# Patient Record
Sex: MALE | Race: BLACK OR AFRICAN AMERICAN | NOT HISPANIC OR LATINO | Employment: UNEMPLOYED | RURAL
[De-identification: names, ages, dates, MRNs, and addresses within clinical notes are randomized per-mention and may not be internally consistent; named-entity substitution may affect disease eponyms.]

---

## 2021-11-10 ENCOUNTER — OFFICE VISIT (OUTPATIENT)
Dept: CARDIOLOGY | Facility: CLINIC | Age: 66
End: 2021-11-10
Payer: MEDICARE

## 2021-11-10 VITALS
OXYGEN SATURATION: 98 % | BODY MASS INDEX: 34.27 KG/M2 | HEART RATE: 80 BPM | SYSTOLIC BLOOD PRESSURE: 112 MMHG | WEIGHT: 253 LBS | HEIGHT: 72 IN | DIASTOLIC BLOOD PRESSURE: 72 MMHG

## 2021-11-10 VITALS
HEART RATE: 64 BPM | HEIGHT: 72 IN | DIASTOLIC BLOOD PRESSURE: 70 MMHG | BODY MASS INDEX: 35.08 KG/M2 | SYSTOLIC BLOOD PRESSURE: 115 MMHG | WEIGHT: 259 LBS

## 2021-11-10 DIAGNOSIS — E78.5 HYPERLIPIDEMIA, UNSPECIFIED HYPERLIPIDEMIA TYPE: ICD-10-CM

## 2021-11-10 DIAGNOSIS — I10 HYPERTENSION, UNSPECIFIED TYPE: Primary | ICD-10-CM

## 2021-11-10 DIAGNOSIS — I50.32 CHRONIC DIASTOLIC CONGESTIVE HEART FAILURE: ICD-10-CM

## 2021-11-10 PROCEDURE — 1159F MED LIST DOCD IN RCRD: CPT | Mod: CPTII,,, | Performed by: NURSE PRACTITIONER

## 2021-11-10 PROCEDURE — 93010 EKG 12-LEAD: ICD-10-PCS | Mod: S$PBB,,, | Performed by: INTERNAL MEDICINE

## 2021-11-10 PROCEDURE — 99214 OFFICE O/P EST MOD 30 MIN: CPT | Mod: PBBFAC | Performed by: NURSE PRACTITIONER

## 2021-11-10 PROCEDURE — 1160F PR REVIEW ALL MEDS BY PRESCRIBER/CLIN PHARMACIST DOCUMENTED: ICD-10-PCS | Mod: CPTII,,, | Performed by: NURSE PRACTITIONER

## 2021-11-10 PROCEDURE — 3008F BODY MASS INDEX DOCD: CPT | Mod: CPTII,,, | Performed by: NURSE PRACTITIONER

## 2021-11-10 PROCEDURE — 3008F PR BODY MASS INDEX (BMI) DOCUMENTED: ICD-10-PCS | Mod: CPTII,,, | Performed by: NURSE PRACTITIONER

## 2021-11-10 PROCEDURE — 3078F DIAST BP <80 MM HG: CPT | Mod: CPTII,,, | Performed by: NURSE PRACTITIONER

## 2021-11-10 PROCEDURE — 1159F PR MEDICATION LIST DOCUMENTED IN MEDICAL RECORD: ICD-10-PCS | Mod: CPTII,,, | Performed by: NURSE PRACTITIONER

## 2021-11-10 PROCEDURE — 93010 ELECTROCARDIOGRAM REPORT: CPT | Mod: S$PBB,,, | Performed by: INTERNAL MEDICINE

## 2021-11-10 PROCEDURE — 3078F PR MOST RECENT DIASTOLIC BLOOD PRESSURE < 80 MM HG: ICD-10-PCS | Mod: CPTII,,, | Performed by: NURSE PRACTITIONER

## 2021-11-10 PROCEDURE — 99214 PR OFFICE/OUTPT VISIT, EST, LEVL IV, 30-39 MIN: ICD-10-PCS | Mod: S$PBB,,, | Performed by: NURSE PRACTITIONER

## 2021-11-10 PROCEDURE — 3074F PR MOST RECENT SYSTOLIC BLOOD PRESSURE < 130 MM HG: ICD-10-PCS | Mod: CPTII,,, | Performed by: NURSE PRACTITIONER

## 2021-11-10 PROCEDURE — 3074F SYST BP LT 130 MM HG: CPT | Mod: CPTII,,, | Performed by: NURSE PRACTITIONER

## 2021-11-10 PROCEDURE — 93005 ELECTROCARDIOGRAM TRACING: CPT | Mod: PBBFAC | Performed by: INTERNAL MEDICINE

## 2021-11-10 PROCEDURE — 99214 OFFICE O/P EST MOD 30 MIN: CPT | Mod: S$PBB,,, | Performed by: NURSE PRACTITIONER

## 2021-11-10 PROCEDURE — 1160F RVW MEDS BY RX/DR IN RCRD: CPT | Mod: CPTII,,, | Performed by: NURSE PRACTITIONER

## 2021-11-10 RX ORDER — ASPIRIN 81 MG/1
81 TABLET ORAL DAILY
COMMUNITY
End: 2024-03-19 | Stop reason: SDUPTHER

## 2021-11-10 RX ORDER — AMLODIPINE BESYLATE 10 MG/1
10 TABLET ORAL DAILY
COMMUNITY
Start: 2021-07-27

## 2021-11-10 RX ORDER — GLIPIZIDE 10 MG/1
10 TABLET ORAL 2 TIMES DAILY
COMMUNITY

## 2021-11-10 RX ORDER — ATORVASTATIN CALCIUM 40 MG/1
1 TABLET, FILM COATED ORAL NIGHTLY
COMMUNITY
Start: 2021-09-28

## 2021-11-10 RX ORDER — CHOLECALCIFEROL (VITAMIN D3) 50 MCG
1 CAPSULE ORAL DAILY
COMMUNITY
End: 2024-03-19 | Stop reason: SDUPTHER

## 2021-11-10 RX ORDER — INSULIN GLARGINE 100 [IU]/ML
42 INJECTION, SOLUTION SUBCUTANEOUS DAILY
COMMUNITY

## 2021-11-10 RX ORDER — PANTOPRAZOLE SODIUM 40 MG/1
40 TABLET, DELAYED RELEASE ORAL DAILY
COMMUNITY

## 2021-11-10 RX ORDER — CARVEDILOL 25 MG/1
25 TABLET ORAL 2 TIMES DAILY
COMMUNITY

## 2021-11-10 RX ORDER — FUROSEMIDE 20 MG/1
10 TABLET ORAL DAILY
COMMUNITY
Start: 2021-07-07

## 2021-11-10 RX ORDER — WARFARIN SODIUM 5 MG/1
5 TABLET ORAL DAILY
COMMUNITY

## 2022-11-10 ENCOUNTER — OFFICE VISIT (OUTPATIENT)
Dept: CARDIOLOGY | Facility: CLINIC | Age: 67
End: 2022-11-10
Payer: MEDICARE

## 2022-11-10 VITALS
WEIGHT: 244 LBS | HEART RATE: 72 BPM | HEIGHT: 72 IN | DIASTOLIC BLOOD PRESSURE: 80 MMHG | SYSTOLIC BLOOD PRESSURE: 124 MMHG | BODY MASS INDEX: 33.05 KG/M2 | OXYGEN SATURATION: 99 %

## 2022-11-10 DIAGNOSIS — I50.32 HYPERTENSIVE HEART DISEASE WITH CHRONIC DIASTOLIC CONGESTIVE HEART FAILURE: Primary | ICD-10-CM

## 2022-11-10 DIAGNOSIS — I11.0 HYPERTENSIVE HEART DISEASE WITH CHRONIC DIASTOLIC CONGESTIVE HEART FAILURE: Primary | ICD-10-CM

## 2022-11-10 PROCEDURE — 3008F PR BODY MASS INDEX (BMI) DOCUMENTED: ICD-10-PCS | Mod: CPTII,,, | Performed by: NURSE PRACTITIONER

## 2022-11-10 PROCEDURE — 3074F SYST BP LT 130 MM HG: CPT | Mod: CPTII,,, | Performed by: NURSE PRACTITIONER

## 2022-11-10 PROCEDURE — 3008F BODY MASS INDEX DOCD: CPT | Mod: CPTII,,, | Performed by: NURSE PRACTITIONER

## 2022-11-10 PROCEDURE — 1159F MED LIST DOCD IN RCRD: CPT | Mod: CPTII,,, | Performed by: NURSE PRACTITIONER

## 2022-11-10 PROCEDURE — 3079F PR MOST RECENT DIASTOLIC BLOOD PRESSURE 80-89 MM HG: ICD-10-PCS | Mod: CPTII,,, | Performed by: NURSE PRACTITIONER

## 2022-11-10 PROCEDURE — 3079F DIAST BP 80-89 MM HG: CPT | Mod: CPTII,,, | Performed by: NURSE PRACTITIONER

## 2022-11-10 PROCEDURE — 99214 OFFICE O/P EST MOD 30 MIN: CPT | Mod: S$PBB,,, | Performed by: NURSE PRACTITIONER

## 2022-11-10 PROCEDURE — 99214 PR OFFICE/OUTPT VISIT, EST, LEVL IV, 30-39 MIN: ICD-10-PCS | Mod: S$PBB,,, | Performed by: NURSE PRACTITIONER

## 2022-11-10 PROCEDURE — 1160F PR REVIEW ALL MEDS BY PRESCRIBER/CLIN PHARMACIST DOCUMENTED: ICD-10-PCS | Mod: CPTII,,, | Performed by: NURSE PRACTITIONER

## 2022-11-10 PROCEDURE — 93010 EKG 12-LEAD: ICD-10-PCS | Mod: S$PBB,,, | Performed by: INTERNAL MEDICINE

## 2022-11-10 PROCEDURE — 93005 ELECTROCARDIOGRAM TRACING: CPT | Mod: PBBFAC | Performed by: INTERNAL MEDICINE

## 2022-11-10 PROCEDURE — 1160F RVW MEDS BY RX/DR IN RCRD: CPT | Mod: CPTII,,, | Performed by: NURSE PRACTITIONER

## 2022-11-10 PROCEDURE — 1159F PR MEDICATION LIST DOCUMENTED IN MEDICAL RECORD: ICD-10-PCS | Mod: CPTII,,, | Performed by: NURSE PRACTITIONER

## 2022-11-10 PROCEDURE — 99214 OFFICE O/P EST MOD 30 MIN: CPT | Mod: PBBFAC | Performed by: NURSE PRACTITIONER

## 2022-11-10 PROCEDURE — 93010 ELECTROCARDIOGRAM REPORT: CPT | Mod: S$PBB,,, | Performed by: INTERNAL MEDICINE

## 2022-11-10 PROCEDURE — 3074F PR MOST RECENT SYSTOLIC BLOOD PRESSURE < 130 MM HG: ICD-10-PCS | Mod: CPTII,,, | Performed by: NURSE PRACTITIONER

## 2022-11-10 RX ORDER — POTASSIUM CHLORIDE 750 MG/1
10 CAPSULE, EXTENDED RELEASE ORAL 2 TIMES DAILY
COMMUNITY
Start: 2022-09-28

## 2022-11-10 NOTE — PROGRESS NOTES
"[facility] note        DATE OF SERVICE: 11/10/2022       PCP: Jamarcus Lao MD      CHIEF COMPLAINT:   Chief Complaint   Patient presents with    Follow-up     Patient denies any cardiac complaints today.        HISTORY OF PRESENT ILLNESS:  Fredy Clemetn is a 67 y.o. male with a PMH of   Past Medical History:   Diagnosis Date    Cancer     CHF (congestive heart failure)     Diabetes mellitus     History of DVT (deep vein thrombosis)     Hyperlipidemia     Hypertension     Kidney disease      who presents for routine follow up. He states he "hears" his heart beating at night sometimes". He denies heart racing, skipping or pounding.   Chief Complaint   Patient presents with    Follow-up     Patient denies any cardiac complaints today.            PAST MEDICAL HISTORY:  Past Medical History:   Diagnosis Date    Cancer     CHF (congestive heart failure)     Diabetes mellitus     History of DVT (deep vein thrombosis)     Hyperlipidemia     Hypertension     Kidney disease        PAST SURGICAL HISTORY:  Past Surgical History:   Procedure Laterality Date    PROSTATECTOMY         SOCIAL HISTORY:  Social History     Socioeconomic History    Marital status:    Tobacco Use    Smoking status: Every Day    Smokeless tobacco: Never   Substance and Sexual Activity    Alcohol use: Not Currently    Drug use: Never       FAMILY HISTORY:  Family History   Problem Relation Age of Onset    Diabetes Brother          ALLERGIES:  Review of patient's allergies indicates:  No Known Allergies     MEDICATIONS:    Current Outpatient Medications:     amLODIPine (NORVASC) 10 MG tablet, Take 10 mg by mouth once daily. for 90 days, Disp: , Rfl:     aspirin (ECOTRIN) 81 MG EC tablet, Take 81 mg by mouth once daily., Disp: , Rfl:     atorvastatin (LIPITOR) 40 MG tablet, Take 1 tablet by mouth nightly., Disp: , Rfl:     carvediloL (COREG) 25 MG tablet, Take 25 mg by mouth 2 (two) times a day., Disp: , Rfl:     furosemide " (LASIX) 20 MG tablet, Take 20 mg by mouth once daily., Disp: , Rfl:     glipiZIDE (GLUCOTROL) 10 MG tablet, Take 10 mg by mouth 2 (two) times a day., Disp: , Rfl:     insulin glargine (LANTUS) 100 unit/mL injection, Inject 42 Units into the skin Daily., Disp: , Rfl:     omega 3-dha-epa-fish oil (FISH OIL) 100-160-1,000 mg Cap, Take by mouth., Disp: , Rfl:     pantoprazole (PROTONIX) 40 MG tablet, Take 40 mg by mouth Daily., Disp: , Rfl:     potassium chloride (MICRO-K) 10 MEQ CpSR, Take 10 mEq by mouth 2 (two) times daily., Disp: , Rfl:     warfarin (COUMADIN) 5 MG tablet, Take 5 mg by mouth Daily., Disp: , Rfl:   Medications have been reviewed and reconciled.     PHYSICAL EXAM:  /80 (BP Location: Left arm, Patient Position: Sitting)   Pulse 72   Ht 6' (1.829 m)   Wt 110.7 kg (244 lb)   SpO2 99%   BMI 33.09 kg/m²   Wt Readings from Last 3 Encounters:   11/10/22 110.7 kg (244 lb)   11/10/21 114.8 kg (253 lb)   11/10/21 117.5 kg (259 lb)      Body mass index is 33.09 kg/m².    Physical Exam  Vitals and nursing note reviewed.   Constitutional:       Appearance: Normal appearance. He is obese.   HENT:      Head: Normocephalic and atraumatic.   Eyes:      Pupils: Pupils are equal, round, and reactive to light.   Neck:      Vascular: No carotid bruit.   Cardiovascular:      Rate and Rhythm: Normal rate and regular rhythm.      Pulses: Normal pulses.      Heart sounds: Normal heart sounds.   Pulmonary:      Effort: Pulmonary effort is normal.      Breath sounds: Normal breath sounds.   Abdominal:      General: Bowel sounds are normal.      Palpations: Abdomen is soft.   Musculoskeletal:      Cervical back: Neck supple.      Right lower leg: No edema.      Left lower leg: No edema.   Skin:     General: Skin is warm and dry.      Capillary Refill: Capillary refill takes less than 2 seconds.   Neurological:      General: No focal deficit present.      Mental Status: He is alert and oriented to person, place, and  time.   Psychiatric:         Mood and Affect: Mood normal.         Behavior: Behavior normal.       LABS REVIEWED:  No results found for: WBC, RBC, HGB, HCT, MCV, MCH, MCHC, RDW, PLT, MPV, NRBC, INR  No results found for: NA, K, CL, CO2, BUN, MG, PHOS  No results found for: CPK, AST, ALT  No results found for: GLU, HGBA1C  No results found for: CHOL, HDL, LDL, TRIG, CHOLHDL    CARDIAC STUDIES REVIEWED:EK/10/2022 RSR with NSTWA; HR 64 bpm    11/10/2021 RSR; HR 74 bpm; old inferior q waves; anterolateral TWA unchanged from EKG done 2/10/2021         ASSESSMENT:   Patient Active Problem List   Diagnosis    Hypertensive heart disease with diastolic congestive heart failure    Hyperlipidemia            Problem List Items Addressed This Visit          Cardiac/Vascular    Hypertensive heart disease with diastolic congestive heart failure - Primary    Relevant Orders    EKG 12-lead        PLAN: Patient has agreed to stop smoking.  Orders Placed This Encounter   Procedures    EKG 12-lead     Standing Status:   Future     Standing Expiration Date:   11/10/2023      RTC: 1 year

## 2023-06-01 DIAGNOSIS — L91.8 SKIN TAG: Primary | ICD-10-CM

## 2023-07-13 ENCOUNTER — OFFICE VISIT (OUTPATIENT)
Dept: DERMATOLOGY | Facility: CLINIC | Age: 68
End: 2023-07-13
Payer: MEDICARE

## 2023-07-13 DIAGNOSIS — D48.9 NEOPLASM OF UNCERTAIN BEHAVIOR: ICD-10-CM

## 2023-07-13 PROCEDURE — 88304 PATHOLOGY, DERMATOLOGY: ICD-10-PCS | Mod: 26,,, | Performed by: PATHOLOGY

## 2023-07-13 PROCEDURE — 99499 NO LOS: ICD-10-PCS | Mod: ,,, | Performed by: STUDENT IN AN ORGANIZED HEALTH CARE EDUCATION/TRAINING PROGRAM

## 2023-07-13 PROCEDURE — 11104 PUNCH BX SKIN SINGLE LESION: CPT | Mod: XS,,, | Performed by: STUDENT IN AN ORGANIZED HEALTH CARE EDUCATION/TRAINING PROGRAM

## 2023-07-13 PROCEDURE — 11307 SHAVE SKIN LESION 1.1-2.0 CM: CPT | Mod: ,,, | Performed by: STUDENT IN AN ORGANIZED HEALTH CARE EDUCATION/TRAINING PROGRAM

## 2023-07-13 PROCEDURE — 11104 PR PUNCH BIOPSY, SKIN, SINGLE LESION: ICD-10-PCS | Mod: XS,,, | Performed by: STUDENT IN AN ORGANIZED HEALTH CARE EDUCATION/TRAINING PROGRAM

## 2023-07-13 PROCEDURE — 11307 PR SHAV SKIN LES 1.1-2.0 CM REMAINDER BODY: ICD-10-PCS | Mod: ,,, | Performed by: STUDENT IN AN ORGANIZED HEALTH CARE EDUCATION/TRAINING PROGRAM

## 2023-07-13 PROCEDURE — 99499 UNLISTED E&M SERVICE: CPT | Mod: ,,, | Performed by: STUDENT IN AN ORGANIZED HEALTH CARE EDUCATION/TRAINING PROGRAM

## 2023-07-13 PROCEDURE — 88304 TISSUE EXAM BY PATHOLOGIST: CPT | Mod: TC,SUR | Performed by: STUDENT IN AN ORGANIZED HEALTH CARE EDUCATION/TRAINING PROGRAM

## 2023-07-13 PROCEDURE — 88304 TISSUE EXAM BY PATHOLOGIST: CPT | Mod: 26,,, | Performed by: PATHOLOGY

## 2023-07-13 NOTE — PROGRESS NOTES
[facility]  Isma Ho MD    [address] [place] [address]  [phone]    Fax: [phone]    Patient Name: Fredy Clement  Medical Record Number: [id number]  PCP: Jamarcus Lao MD  Age: 68 y.o. : 1955  Contact: [phone] (home)     CC: Lesions  History of Present Illness:     Fredy Clement is a 68 y.o.  male with no history of skin cancer  who presents to clinic today for lesion located on the back of the neck that has been present for over 20 years. Symptoms include growth and pruritus. Previous treatments include none. Patient is also concerned with a lesion by the right eye that has been present for 4 years and becomes inflamed.     The patient has no other concerns today.    Review of Systems:     Unremarkable other than mentioned above.     Physical Exam:     General: Relaxed, oriented, alert    Skin examination of the scalp, face, neck, chest, back, abdomen, upper extremities and lower extremities were normal except for as listed below      Assessment and Plan:     1. Neoplasm of Uncertain Behavior x 2     A) black papule located on the posterior neck (1.2 cm)   Ddx includes: inflamed acrochordon    B) subcutaneous nodule located on the right eyebrow  Ddx includes: EIC vs IDN    Shave removal for A     Pre-procedure Diagnosis: as above  Post_procedure Diagnosis: same  Estimated Blood Loss: <1cc    Findings: None  Complications: None  Specimens: to pathology      Written informed consent was obtained after discussing risks including pain, bleeding, infection, recurrence and scarring. The biopsy site was sterilely prepped with alcohol, which was allowed to dry completely, then locally infiltrated with 1% lidocaine with epinephrine, ~3 cc total. A shave removal was obtained using a Dermablade/15 and the specimen was sent to dermatopathology. Aluminum chloride was used for hemostasis. Antibiotic ointment and a clean dressing were applied. The  patient tolerated the procedure well without complications. Verbal and written wound care instructions were given.    Punch Biopsy for B     Pre-procedure Diagnosis: EIC vs IDN  Post_procedure Diagnosis: same  Estimated Blood Loss: 1cc    Findings: None  Complications: None  Specimens: to pathology    Written informed consent was obtained after discussing risks including pain, bleeding, infection, recurrence and scarring. The biopsy site was sterilely prepped with alcohol, which was allowed to dry completely, then locally infiltrated with 1% lidocaine with epinephrine, ~3 cc total. A punch biopsy was obtained using a 4 mm size punch and the specimen was sent to dermatopathology. 1 suture was placed for hemostasis. Petrolatum ointment and a clean dressing were applied. The patient tolerated the procedure well without complications. Verbal and written wound care instructions were given. Sutures should be removed in {7-10 days.             Return to clinic in 7D  SR     AVS printed with patient instructions     Isma Ho MD   Mohs Surgery/Dermatologic Oncology  Dermatology

## 2023-07-17 LAB
ESTROGEN SERPL-MCNC: NORMAL PG/ML
INSULIN SERPL-ACNC: NORMAL U[IU]/ML
LAB AP GROSS DESCRIPTION: NORMAL
LAB AP LABORATORY NOTES: NORMAL
LAB AP SPEC A DDX: NORMAL
LAB AP SPEC A MORPHOLOGY: NORMAL
LAB AP SPEC A PROCEDURE: NORMAL
LAB AP SPEC B DDX: NORMAL
LAB AP SPEC B MORPHOLOGY: NORMAL
LAB AP SPEC B PROCEDURE: NORMAL
T3RU NFR SERPL: NORMAL %

## 2023-07-20 ENCOUNTER — CLINICAL SUPPORT (OUTPATIENT)
Dept: DERMATOLOGY | Facility: CLINIC | Age: 68
End: 2023-07-20
Payer: MEDICARE

## 2023-07-20 DIAGNOSIS — Z48.02 ENCOUNTER FOR REMOVAL OF SUTURES: Primary | ICD-10-CM

## 2023-07-20 NOTE — PROGRESS NOTES
Suture removed from punch biopsy site.  Healing well. No s/s of infection  RTC PRN  Rose Mann,CMA

## 2023-10-27 DIAGNOSIS — I10 ESSENTIAL (PRIMARY) HYPERTENSION: ICD-10-CM

## 2023-10-27 DIAGNOSIS — I50.9 HEART FAILURE, UNSPECIFIED: ICD-10-CM

## 2023-10-27 DIAGNOSIS — E11.65 TYPE 2 DIABETES MELLITUS WITH HYPERGLYCEMIA: ICD-10-CM

## 2023-10-27 DIAGNOSIS — N18.30 CHRONIC KIDNEY DISEASE (CKD), STAGE III (MODERATE): Primary | ICD-10-CM

## 2024-02-20 ENCOUNTER — OFFICE VISIT (OUTPATIENT)
Dept: CARDIOLOGY | Facility: CLINIC | Age: 69
End: 2024-02-20
Payer: MEDICARE

## 2024-02-20 VITALS
WEIGHT: 258.19 LBS | DIASTOLIC BLOOD PRESSURE: 68 MMHG | HEART RATE: 68 BPM | OXYGEN SATURATION: 99 % | SYSTOLIC BLOOD PRESSURE: 116 MMHG | HEIGHT: 72 IN | BODY MASS INDEX: 34.97 KG/M2

## 2024-02-20 DIAGNOSIS — K21.00 GASTROESOPHAGEAL REFLUX DISEASE WITH ESOPHAGITIS WITHOUT HEMORRHAGE: ICD-10-CM

## 2024-02-20 DIAGNOSIS — N18.30 CKD STAGE 3 SECONDARY TO DIABETES: ICD-10-CM

## 2024-02-20 DIAGNOSIS — I50.32 HYPERTENSIVE HEART DISEASE WITH CHRONIC DIASTOLIC CONGESTIVE HEART FAILURE: ICD-10-CM

## 2024-02-20 DIAGNOSIS — Z86.711 HISTORY OF PULMONARY EMBOLISM: ICD-10-CM

## 2024-02-20 DIAGNOSIS — I11.0 HYPERTENSIVE HEART DISEASE WITH CHRONIC DIASTOLIC CONGESTIVE HEART FAILURE: ICD-10-CM

## 2024-02-20 DIAGNOSIS — E78.5 HYPERLIPIDEMIA, UNSPECIFIED HYPERLIPIDEMIA TYPE: ICD-10-CM

## 2024-02-20 DIAGNOSIS — I10 HYPERTENSION, UNSPECIFIED TYPE: Primary | ICD-10-CM

## 2024-02-20 DIAGNOSIS — E11.9 DIABETES MELLITUS WITHOUT COMPLICATION: ICD-10-CM

## 2024-02-20 DIAGNOSIS — E11.22 CKD STAGE 3 SECONDARY TO DIABETES: ICD-10-CM

## 2024-02-20 PROCEDURE — 3288F FALL RISK ASSESSMENT DOCD: CPT | Mod: CPTII,,, | Performed by: NURSE PRACTITIONER

## 2024-02-20 PROCEDURE — 93005 ELECTROCARDIOGRAM TRACING: CPT | Mod: PBBFAC | Performed by: INTERNAL MEDICINE

## 2024-02-20 PROCEDURE — 3078F DIAST BP <80 MM HG: CPT | Mod: CPTII,,, | Performed by: NURSE PRACTITIONER

## 2024-02-20 PROCEDURE — 93010 ELECTROCARDIOGRAM REPORT: CPT | Mod: S$PBB,,, | Performed by: INTERNAL MEDICINE

## 2024-02-20 PROCEDURE — 99214 OFFICE O/P EST MOD 30 MIN: CPT | Mod: S$PBB,,, | Performed by: NURSE PRACTITIONER

## 2024-02-20 PROCEDURE — 3074F SYST BP LT 130 MM HG: CPT | Mod: CPTII,,, | Performed by: NURSE PRACTITIONER

## 2024-02-20 PROCEDURE — 1101F PT FALLS ASSESS-DOCD LE1/YR: CPT | Mod: CPTII,,, | Performed by: NURSE PRACTITIONER

## 2024-02-20 PROCEDURE — 3008F BODY MASS INDEX DOCD: CPT | Mod: CPTII,,, | Performed by: NURSE PRACTITIONER

## 2024-02-20 PROCEDURE — 1126F AMNT PAIN NOTED NONE PRSNT: CPT | Mod: CPTII,,, | Performed by: NURSE PRACTITIONER

## 2024-02-20 PROCEDURE — 4010F ACE/ARB THERAPY RXD/TAKEN: CPT | Mod: CPTII,,, | Performed by: NURSE PRACTITIONER

## 2024-02-20 PROCEDURE — 99215 OFFICE O/P EST HI 40 MIN: CPT | Mod: PBBFAC | Performed by: NURSE PRACTITIONER

## 2024-02-20 PROCEDURE — 1159F MED LIST DOCD IN RCRD: CPT | Mod: CPTII,,, | Performed by: NURSE PRACTITIONER

## 2024-02-20 PROCEDURE — 1160F RVW MEDS BY RX/DR IN RCRD: CPT | Mod: CPTII,,, | Performed by: NURSE PRACTITIONER

## 2024-02-20 RX ORDER — ASPIRIN 325 MG
50000 TABLET, DELAYED RELEASE (ENTERIC COATED) ORAL
COMMUNITY
Start: 2024-01-22

## 2024-02-20 RX ORDER — LOSARTAN POTASSIUM 25 MG/1
25 TABLET ORAL DAILY
COMMUNITY
Start: 2024-01-18

## 2024-02-20 NOTE — PROGRESS NOTES
"PCP: Jamacrus Lao MD    Referring Provider:     Subjective:   Fredy Clement is a 69 y.o. male with hx of h/o PE (chronically anticoagulated with warfarin/INR followed by PCP), HTN, hypertensive heart disease, HLD, GERD, and CKD stage III,  who presents for routine follow up. He states that he is doing well. He has palpitations "every now and then" but otherwise asymptomatic.         Fhx:  Family History   Problem Relation Age of Onset    Diabetes Brother      Shx:   Social History     Socioeconomic History    Marital status:    Tobacco Use    Smoking status: Every Day    Smokeless tobacco: Never   Substance and Sexual Activity    Alcohol use: Not Currently    Drug use: Never       EKG 2/20/2024 sinus bradycardia; HR 58 bpm; NS-TWA; when compared with EKG 11/10/2022 no significant change was found.    ECHO [facility], [place] 4/2023  Results unavailable    [facility] No results found for this or any previous visit.        No results found for: "NA", "K", "CL", "CO2", "BUN", "CREATININE", "CALCIUM", "ANIONGAP", "ESTGFRAFRICA", "EGFRNONAA"    No results found for: "CHOL"  No results found for: "HDL"  No results found for: "LDLCALC"  No results found for: "TRIG"  No results found for: "CHOLHDL"    No results found for: "WBC", "HGB", "HCT", "MCV", "PLT"        Current Outpatient Medications:     amLODIPine (NORVASC) 10 MG tablet, Take 10 mg by mouth once daily. for 90 days, Disp: , Rfl:     aspirin (ECOTRIN) 81 MG EC tablet, Take 81 mg by mouth once daily., Disp: , Rfl:     atorvastatin (LIPITOR) 40 MG tablet, Take 1 tablet by mouth nightly., Disp: , Rfl:     carvediloL (COREG) 25 MG tablet, Take 25 mg by mouth 2 (two) times a day., Disp: , Rfl:     cholecalciferol, vitamin D3, 1,250 mcg (50,000 unit) capsule, Take 50,000 Units by mouth every 7 days., Disp: , Rfl:     furosemide (LASIX) 20 MG tablet, Take 10 mg by mouth once daily., Disp: , Rfl:     glipiZIDE (GLUCOTROL) 10 MG tablet, Take 10 mg by mouth 2 (two) times a " day., Disp: , Rfl:     insulin glargine (LANTUS) 100 unit/mL injection, Inject 42 Units into the skin Daily., Disp: , Rfl:     losartan (COZAAR) 25 MG tablet, Take 25 mg by mouth once daily., Disp: , Rfl:     omega 3-dha-epa-fish oil (FISH OIL) 100-160-1,000 mg Cap, Take 1 capsule by mouth once daily., Disp: , Rfl:     pantoprazole (PROTONIX) 40 MG tablet, Take 40 mg by mouth Daily., Disp: , Rfl:     potassium chloride (MICRO-K) 10 MEQ CpSR, Take 10 mEq by mouth 2 (two) times daily., Disp: , Rfl:     warfarin (COUMADIN) 5 MG tablet, Take 5 mg by mouth Daily., Disp: , Rfl:   Meds reviewed and reconciled.      Review of Systems   Respiratory:  Negative for shortness of breath.    Cardiovascular:  Positive for palpitations. Negative for chest pain, orthopnea, claudication, leg swelling and PND.        Occ palpations   Neurological:  Negative for dizziness, loss of consciousness and weakness.           Objective:   /68 (BP Location: Left arm, Patient Position: Sitting)   Pulse 68   Ht 6' (1.829 m)   Wt 117.1 kg (258 lb 3.2 oz)   SpO2 99%   BMI 35.02 kg/m²     Physical Exam  Vitals and nursing note reviewed.   Constitutional:       Appearance: Normal appearance. He is obese.   HENT:      Head: Normocephalic and atraumatic.   Neck:      Vascular: No carotid bruit.   Cardiovascular:      Rate and Rhythm: Normal rate and regular rhythm.      Pulses: Normal pulses.      Heart sounds: Normal heart sounds.   Pulmonary:      Effort: Pulmonary effort is normal.      Breath sounds: Normal breath sounds.   Abdominal:      Palpations: Abdomen is soft.   Musculoskeletal:      Cervical back: Neck supple.      Right lower leg: No edema.      Left lower leg: No edema.   Skin:     General: Skin is warm and dry.      Capillary Refill: Capillary refill takes less than 2 seconds.   Neurological:      Mental Status: He is alert.           Assessment:     1. Hypertension, unspecified type  EKG 12-lead    EKG 12-lead      2.  Hyperlipidemia, unspecified hyperlipidemia type        3. Hypertensive heart disease with chronic diastolic congestive heart failure        4. History of pulmonary embolism        5. CKD stage 3 secondary to diabetes        6. Gastroesophageal reflux disease with esophagitis without hemorrhage        7. Diabetes mellitus without complication              Plan:   Hyperlipidemia  Lipids followed by [facility]  Lipitor 40 mg po daily      Hypertensive heart disease with diastolic congestive heart failure  Patient is identified as having Diastolic (HFpEF) heart failure that is Chronic. CHF is currently controlled. Latest ECHO performed and demonstrates- No results found for this or any previous visit.  . Continue Beta Blocker and Furosemide and monitor clinical status closely.     History of pulmonary embolism  Chronically anticoagulated on warfarin; INR followed by PCP    CKD stage 3 secondary to diabetes  Followed by PCP    Gastroesophageal reflux disease with esophagitis without hemorrhage  On PPI therapy    RTC: 1 year

## 2024-02-20 NOTE — ASSESSMENT & PLAN NOTE
Patient is identified as having Diastolic (HFpEF) heart failure that is Chronic. CHF is currently controlled. Latest ECHO performed and demonstrates- No results found for this or any previous visit.  . Continue Beta Blocker and Furosemide and monitor clinical status closely.

## 2024-02-21 LAB
OHS QRS DURATION: 82 MS
OHS QTC CALCULATION: 410 MS

## 2024-03-19 ENCOUNTER — OFFICE VISIT (OUTPATIENT)
Dept: NEPHROLOGY | Facility: CLINIC | Age: 69
End: 2024-03-19
Payer: MEDICARE

## 2024-03-19 VITALS
HEIGHT: 72 IN | RESPIRATION RATE: 18 BRPM | OXYGEN SATURATION: 100 % | HEART RATE: 68 BPM | DIASTOLIC BLOOD PRESSURE: 90 MMHG | SYSTOLIC BLOOD PRESSURE: 138 MMHG | BODY MASS INDEX: 34.54 KG/M2 | WEIGHT: 255 LBS

## 2024-03-19 DIAGNOSIS — I12.9 HYPERTENSIVE NEPHROSCLEROSIS, STAGE 1 THROUGH STAGE 4 OR UNSPECIFIED CHRONIC KIDNEY DISEASE: ICD-10-CM

## 2024-03-19 DIAGNOSIS — N18.4 CKD (CHRONIC KIDNEY DISEASE), STAGE IV: Primary | ICD-10-CM

## 2024-03-19 DIAGNOSIS — E11.21 DIABETIC NEPHROPATHY ASSOCIATED WITH TYPE 2 DIABETES MELLITUS: ICD-10-CM

## 2024-03-19 DIAGNOSIS — I10 ESSENTIAL HYPERTENSION: ICD-10-CM

## 2024-03-19 PROCEDURE — 4010F ACE/ARB THERAPY RXD/TAKEN: CPT | Mod: CPTII,,, | Performed by: INTERNAL MEDICINE

## 2024-03-19 PROCEDURE — 3080F DIAST BP >= 90 MM HG: CPT | Mod: CPTII,,, | Performed by: INTERNAL MEDICINE

## 2024-03-19 PROCEDURE — 1101F PT FALLS ASSESS-DOCD LE1/YR: CPT | Mod: CPTII,,, | Performed by: INTERNAL MEDICINE

## 2024-03-19 PROCEDURE — 1159F MED LIST DOCD IN RCRD: CPT | Mod: CPTII,,, | Performed by: INTERNAL MEDICINE

## 2024-03-19 PROCEDURE — 3066F NEPHROPATHY DOC TX: CPT | Mod: CPTII,,, | Performed by: INTERNAL MEDICINE

## 2024-03-19 PROCEDURE — 1126F AMNT PAIN NOTED NONE PRSNT: CPT | Mod: CPTII,,, | Performed by: INTERNAL MEDICINE

## 2024-03-19 PROCEDURE — 3008F BODY MASS INDEX DOCD: CPT | Mod: CPTII,,, | Performed by: INTERNAL MEDICINE

## 2024-03-19 PROCEDURE — 3075F SYST BP GE 130 - 139MM HG: CPT | Mod: CPTII,,, | Performed by: INTERNAL MEDICINE

## 2024-03-19 PROCEDURE — 99215 OFFICE O/P EST HI 40 MIN: CPT | Mod: PBBFAC | Performed by: INTERNAL MEDICINE

## 2024-03-19 PROCEDURE — 3288F FALL RISK ASSESSMENT DOCD: CPT | Mod: CPTII,,, | Performed by: INTERNAL MEDICINE

## 2024-03-19 PROCEDURE — 99204 OFFICE O/P NEW MOD 45 MIN: CPT | Mod: S$PBB,,, | Performed by: INTERNAL MEDICINE

## 2024-03-19 RX ORDER — CHOLECALCIFEROL (VITAMIN D3) 25 MCG
5000 TABLET ORAL
COMMUNITY

## 2024-03-19 RX ORDER — GLUCOSAM/CHONDRO/HERB 149/HYAL 750-100 MG
TABLET ORAL
COMMUNITY
Start: 2023-02-16

## 2024-03-19 RX ORDER — IBUPROFEN 100 MG/5ML
500 SUSPENSION, ORAL (FINAL DOSE FORM) ORAL
COMMUNITY

## 2024-03-19 RX ORDER — LANCETS
EACH MISCELLANEOUS
COMMUNITY
Start: 2023-12-22

## 2024-03-19 RX ORDER — BLOOD SUGAR DIAGNOSTIC
STRIP MISCELLANEOUS
COMMUNITY
Start: 2024-02-08

## 2024-03-19 RX ORDER — AMLODIPINE BESYLATE 5 MG/1
5 TABLET ORAL
COMMUNITY
End: 2024-03-19 | Stop reason: DRUGHIGH

## 2024-03-19 RX ORDER — AMMONIUM LACTATE 12 G/100G
LOTION TOPICAL
COMMUNITY
Start: 2023-10-16

## 2024-03-19 RX ORDER — TRIAMCINOLONE ACETONIDE 1 MG/G
OINTMENT TOPICAL
COMMUNITY
Start: 2024-01-18 | End: 2024-03-19

## 2024-03-19 RX ORDER — PRAVASTATIN SODIUM 10 MG/1
10 TABLET ORAL
COMMUNITY
End: 2024-03-19

## 2024-03-19 RX ORDER — CICLOPIROX 80 MG/ML
SOLUTION TOPICAL
COMMUNITY
Start: 2023-10-16

## 2024-03-19 RX ORDER — LOVASTATIN 20 MG/1
20 TABLET ORAL
COMMUNITY
End: 2024-03-19

## 2024-03-19 RX ORDER — PEN NEEDLE, DIABETIC 32 GX 1/4"
NEEDLE, DISPOSABLE MISCELLANEOUS
COMMUNITY
Start: 2023-12-19

## 2024-03-19 RX ORDER — ASPIRIN 81 MG/1
81 TABLET ORAL
COMMUNITY

## 2024-03-19 RX ORDER — EMPAGLIFLOZIN 10 MG/1
TABLET, FILM COATED ORAL
COMMUNITY
Start: 2023-11-13 | End: 2024-03-19

## 2024-03-19 RX ORDER — VIBEGRON 75 MG/1
TABLET, FILM COATED ORAL
COMMUNITY
Start: 2024-02-21 | End: 2024-03-19

## 2024-03-19 NOTE — PROGRESS NOTES
[facility] History and Physical  Patient Name: Fredy Clement  MRN: [id number]  Age: 69 y.o.  : 1955    Date: 3/19/2024 2:52 PM    Chief Complaint: Establish Care    HPI :   Mr Clement presents to Nephrology clinic to establish care. He is followed by CAROLE Tellez as his primary care provider.     HTN: diagnosed in . Current regimen includes amlodipine 10 mg daily, carvedilol 25 mg b.i.d., Lasix 20 mg daily, losartan 25 mg daily. Elevated today.     DM2: diagnosed in . Current regimen includes glipizide, Jardiance      Hx of PE: on coumadin    Hx of stress urinary incontinence following a robotic assisted lap prostatectomy. Patient had artificial urinary sphincter placed by Dr. Fermin. No further issues with urination.     Nephrology history: No FH of kidney disease, no nephrolithiasis, or recurrent UTIs. No OTC medications including NSAIDs. Prior records from 10/2023 with eGFR 29, serum creatinine >2. He was previously see with Dr. Krishnamurthy. He wishes to transfer his to [facility].     Patient denies any CP, SOB, peripheral edema, dysuria, hematuria, changes in urinary habits, or increased frequency of urination.     Past Medical History:  has a past medical history of Cancer, CHF (congestive heart failure), Diabetes mellitus, History of DVT (deep vein thrombosis), Hyperlipidemia, Hypertension, and Kidney disease.     Past Surgical History:   has a past surgical history that includes Prostatectomy.     Family History:  family history includes Diabetes in his brother. No family history of kidney disease.     Social History:   reports that he has been smoking. He has never used smokeless tobacco. He reports that he does not currently use alcohol. He reports that he does not use drugs.     Allergies: has No Known Allergies.     Medications: Reviewed including OTC medications, herbal supplements, and NSAIDS.     Old records have been reviewed.      Review  Chief complaint:   Chief Complaint   Patient presents with   • Follow-up     3mth- benign hyp       Vitals:  Visit Vitals  /76 (BP Location: LUE - Left upper extremity, Patient Position: Sitting, Cuff Size: Large Adult)   Pulse 72   Ht 5' 10.5\" (1.791 m)   Wt 99.3 kg (219 lb)   SpO2 94%   BMI 30.98 kg/m²       HISTORY OF PRESENT ILLNESS     Mr Browne is at clinic for a regularly scheduled visit to followup on;    Exertional dyspnea  (primary encounter diagnosis)  Benign hypertension with chronic kidney disease, stage III (CMS/HCC)  Fatty infiltration of liver  Mild emphysema (CMS/HCC)  Coronary artery calcification seen on CAT scan    He reports that his WATT never returned to baseline after his pneumonia in January. Denies chest pain or unusual fatigue or fatigability. Pt says nothing else is new in his health since last visit. Got Covid booster yesterday.           Other significant problems:  Patient Active Problem List    Diagnosis Date Noted   • Mild emphysema (CMS/HCC) 12/13/2022     Priority: Medium     9/2022: reported on CT done for cancer screening.     • Benign hypertension with chronic kidney disease, stage III (CMS/HCC) 08/27/2022     Priority: Medium     2019: From Dr Amaral's note     • Coronary artery calcification seen on CAT scan 12/13/2022     Priority: Low     9/2022: Reported on CT chest done for cancer screening.     • History of fracture of left ankle 10/12/2022     Priority: Low   • Corns and callosities 09/27/2022     Priority: Low   • Acquired ankle pronation, right 09/27/2022     Priority: Low   • Hallux limitus of right foot 09/27/2022     Priority: Low   • Pain in toe of right foot 09/27/2022     Priority: Low   • Bilateral foot pain 09/27/2022     Priority: Low   • Tinea pedis of both feet 09/27/2022     Priority: Low   • Onychomycosis 09/27/2022     Priority: Low   • Osteochondral lesion 09/27/2022     Priority: Low   • Arthritis of both ankles 09/27/2022     Priority: Low   •  "of Systems:  ROS: A 10 point ROS was completed and found to be negative except for that mentioned above.          Physical Exam:  Vitals:    03/19/24 1444   BP: (!) 138/90   Pulse: 68   Resp: 18       Constitutional: sitting in chair, in NAD  Eyes: EOMI, white sclera  ENMT: moist mucus membranes, nares patent  Cardiovascular: normal rate, S1/S2 noted, no edema  Respiratory: symmetrical chest expansion, CTA-B  Gastrointestinal: +BS, soft, NT/ND  Musculoskeletal: normal, no joint erythema/effusions  Skin: no rash, no purpura, warm extremities  Neurological: Alert and Oriented x 4, afocal    Labs:   No results found for: "NA", "K", "CREATININE", "ALT", "AST", "HGBA1C", "LDLCALC", "CHOL", "HDL", "TRIG", "TSH", "WBC", "HGB", "HCT", "PLT", "PSA"     Otherwise Reviewed    Assessment/Plan:       CKD stage IV in setting of diabetic nephropathy, hypertensive nephrosclerosis. Baseline sCr TBD, today sCr pending. Counseled to avoid nephrotoxic agents such as NSAIDs.     Proteinuria: urine Prot:Creat ratio is pending. Patient is on RAAS blockade with ARB.     Anemia: CBC pending    BMD: Calcium and Phosphorus PTH, Vit D pending    HTN: well controlled with current meds     DM type : on ARB, not on SGTL2i. Send rx for jardiance 25 mg daily. Hold lasix for now.     RTC 4months with CBC, RFP, UA, urine for Prot:creat ratio, PTH, Vit D      [facility]   03/19/2024     " Uncomplicated alcohol dependence (CMS/HCC) 08/30/2022     Priority: Low   • Macrocytic anemia 08/27/2022     Priority: Low     2019: Seen in consult with Dr SHAKA Amaral     • Fatty infiltration of liver 08/27/2022     Priority: Low     [facility]         PAST MEDICAL, FAMILY AND SOCIAL HISTORY     Medications:  Current Outpatient Medications   Medication Sig Dispense Refill   • terbinafine (LamISIL) 250 MG tablet Dissolve one tablet in warm water and soak feet 20-30 minutes once a day for 30 days 30 tablet 3   • doxycycline hyclate (VIBRAMYCIN) 100 MG capsule Dissolve one tablet in warm water and soak feet 20-30 minutes once a day for 30 days 30 capsule 3   • pantoprazole (PROTONIX) 40 MG tablet Take 40 mg by mouth daily.     • atorvastatin (LIPITOR) 20 MG tablet Take 20 mg by mouth daily.     • celecoxib (CeleBREX) 100 MG capsule Take 100 mg by mouth daily.     • Ascorbic Acid (vitamin C) 500 MG tablet Take 500 mg by mouth daily.     • VITAMIN D PO Take 5,000 Units by mouth daily.     • vitamin B-12 (CYANOCOBALAMIN) 1000 MCG tablet Take 1,000 mcg by mouth daily.     • zinc methionate 50 MG capsule Take 50 mg by mouth daily.     • folic acid (FOLATE) 1 MG tablet Take 1 mg by mouth daily.     • Omega-3 Fatty Acids (Fish Oil) 1000 MG capsule Take 1 g by mouth daily.     • ferrous sulfate 325 (65 FE) MG tablet Take 325 mg by mouth daily (with breakfast).     • Ipratropium-Albuterol (COMBIVENT RESPIMAT IN) Inhale 1 puff into the lungs 2 (two) times a day.     • FLUoxetine (PROzac) 20 MG capsule Take 1 capsule by mouth daily. 90 capsule 1   • lisinopril (ZESTRIL) 2.5 MG tablet Take 1 tablet by mouth daily. 90 tablet 1   • Thiamine Mononitrate (vitamin B-1) 100 MG tablet Take 1 tablet by mouth daily. 90 tablet 1   • fluticasone-salmeterol 500-50 MCG/ACT inhaler Inhale 1 puff into the lungs in the morning and 1 puff in the evening. 180 each 1   • aspirin 81 MG tablet Take 81 mg by mouth daily.        No current facility-administered medications for this visit.       Allergies:  ALLERGIES:   Allergen Reactions   • Motrin GI UPSET   • Penicillins SWELLING       Past Medical  History/Surgeries:  Past Medical History:   Diagnosis Date   • CKD (chronic kidney disease), stage III (CMS/HCC)    • Concussion    • HLD (hyperlipidemia)    • HTN (hypertension)    • MDD (major depressive disorder)    • Osteoarthritis of right knee    • Pericarditis     [place] [facility] [place] [facility] [place] [facility] [place]   • Pneumonia due to COVID-19 virus 2022   • TIA (transient ischemic attack)     no etiology found       Past Surgical History:   Procedure Laterality Date   • Colonoscopy  2017   • Colonoscopy diagnostic  2022    VANC polyps and advised 3 year repeat   • Esophagogastroduodenoscopy transoral flex w/bx single or mult  2022    NCVAH, findings unknown but put on PPI afterward   • Hb cta coronary wo scoring      Normal coronary CTA with pericardial thickening   • Leg/ankle surgery proc unlisted Left     ORIF from football injury   • Pericardial window      [facility]       Family History:  Family History   Problem Relation Age of Onset   • Pneumonia Mother         fatal at 86  Aspiriation? in nursing home   • Diabetes Mother    • Pneumonia Father         Fatal at 94   • Myocardial Infarction Sister         Fatal age 62   • Cancer Sister         metastatic cancer fatal at 55   • Patient is unaware of any medical problems Sister    • Natural Causes Maternal Grandmother 86   • Diabetes Maternal Grandfather          young   • Suicide Paternal Grandmother    • Alcohol Abuse Paternal Grandfather    • Other Son         fatal overdose at 38   • Patient is unaware of any medical problems Son    • Diabetes Son    • Patient is unaware of any medical problems Son    • Patient is unaware of any medical problems Son    • Patient is unaware of any medical problems Son        Social History:  Social History      Tobacco Use   • Smoking status: Former Smoker     Packs/day: 1.00     Years: 44.00     Pack years: 44.00     Types: Cigarettes     Start date:      Quit date: 2022     Years since quittin.9   • Smokeless tobacco: Never Used   Substance Use Topics   • Alcohol use: Yes     Alcohol/week: 25.0 standard drinks     Types: 25 Standard drinks or equivalent per week     Comment: Some days none, most days 3-4/day. More since son        REVIEW OF SYSTEMS     Review of Systems   Constitutional: Negative for activity change and unexpected weight change (lost 12# while in hospital with Covid 2022).        Does elliptical 2-3x/week.   Bowls 2x/week  Still thinking of starting pickleball   HENT: Positive for hearing loss and tinnitus. Negative for dental problem (partial on bottoms, had a lot of work on discharge from [facility]), trouble swallowing and voice change.    Eyes: Negative for visual disturbance.        Last eye exam May 2022, new glasses   Respiratory: Positive for shortness of breath (more so than before Covid in 2022). Negative for cough (morning cough) and wheezing.    Cardiovascular: Negative for chest pain, palpitations and leg swelling.   Gastrointestinal: Negative for abdominal pain, blood in stool, constipation, diarrhea, nausea and vomiting.   Genitourinary: Negative for difficulty urinating and hematuria.        Nocturia x 0-1   Musculoskeletal: Positive for arthralgias (Was advised to have TKAs in the 90s) and back pain (chronic).        Strained left shoulder and back from doing dumbells too soon after 2022 hospital stay.    Neurological: Positive for numbness (right hand when gripping the elliptical). Negative for tremors, seizures and headaches.        Denies fall. Denies stroke-like spell since .   Hematological: Bruises/bleeds easily.   Psychiatric/Behavioral: Negative for suicidal ideas.        Pt says mood is good. Says did well on ETOH for a month after last visit, but  weakened after awhile.        PHYSICAL EXAM     Physical Exam  Vitals reviewed.   Constitutional:       General: He is not in acute distress.     Appearance: Normal appearance. He is obese. He is not ill-appearing.      Comments: A little toe tapping fidgety today.    HENT:      Head: Normocephalic and atraumatic.      Right Ear: External ear normal.      Left Ear: External ear normal.      Ears:      Comments: Normal conversational hearing    Eyes:      General: No scleral icterus.        Right eye: No discharge.         Left eye: No discharge.      Conjunctiva/sclera: Conjunctivae normal.      Pupils: Pupils are equal, round, and reactive to light.   Neck:      Vascular: No carotid bruit.   Cardiovascular:      Rate and Rhythm: Normal rate and regular rhythm.      Heart sounds: No murmur heard.    Gallop present.   Pulmonary:      Effort: Pulmonary effort is normal. No respiratory distress.      Breath sounds: No wheezing or rales.      Comments: Mildly reduced breath sounds  Abdominal:      General: Bowel sounds are normal. There is no distension.      Palpations: Abdomen is soft.      Tenderness: There is no abdominal tenderness.      Comments: Liver edge palpable, no splenomegaly or abnormal pulsation.   Musculoskeletal:         General: Deformity (left ankle post op) present.      Right lower leg: No edema.      Left lower leg: No edema.   Lymphadenopathy:      Cervical: No cervical adenopathy.   Skin:     General: Skin is warm and dry.      Findings: No lesion or rash.   Neurological:      Mental Status: He is alert and oriented to person, place, and time.      Cranial Nerves: No cranial nerve deficit.      Sensory: No sensory deficit.      Motor: No weakness.      Coordination: Coordination normal.      Gait: Gait normal.      Deep Tendon Reflexes: Reflexes normal.   Psychiatric:         Mood and Affect: Mood normal.         Behavior: Behavior normal.       Wt Readings from Last 10 Encounters:   12/13/22  99.3 kg (219 lb)   10/12/22 98.9 kg (218 lb)   09/27/22 96.6 kg (213 lb)   08/30/22 99.3 kg (219 lb)   09/11/19 96.4 kg (212 lb 9.6 oz)   08/14/19 96.2 kg (212 lb 1.6 oz)     Recent Results (from the past 1008 hour(s))   VITAMIN B12 AND FOLATE    Collection Time: 12/07/22  7:32 AM   Result Value Ref Range    Vitamin B12 904 211 - 911 pg/mL    Folate >24.0 >=5.5 ng/mL   COMPREHENSIVE METABOLIC PANEL    Collection Time: 12/07/22  7:32 AM   Result Value Ref Range    Fasting Status 12 0 - 999 Hours    Sodium 141 135 - 145 mmol/L    Potassium 4.1 3.4 - 5.1 mmol/L    Chloride 106 97 - 110 mmol/L    Carbon Dioxide 28 21 - 32 mmol/L    Anion Gap 11 7 - 19 mmol/L    Glucose 108 (H) 70 - 99 mg/dL    BUN 23 (H) 6 - 20 mg/dL    Creatinine 1.40 (H) 0.67 - 1.17 mg/dL    Glomerular Filtration Rate 56 (L) >=60    BUN/ Creatinine Ratio 16 7 - 25    Calcium 9.3 8.4 - 10.2 mg/dL    Bilirubin, Total 1.1 (H) 0.2 - 1.0 mg/dL    GOT/AST 40 (H) <=37 Units/L    GPT/ALT 85 (H) <64 Units/L    Alkaline Phosphatase 66 45 - 117 Units/L    Albumin 4.3 3.6 - 5.1 g/dL    Protein, Total 6.9 6.4 - 8.2 g/dL    Globulin 2.6 2.0 - 4.0 g/dL    A/G Ratio 1.7 1.0 - 2.4   LIPID PANEL WITH REFLEX    Collection Time: 12/07/22  7:32 AM   Result Value Ref Range    Fasting Status 12 0 - 999 Hours    Cholesterol 157 <=199 mg/dL    Triglycerides 88 <=149 mg/dL    HDL 62 >=40 mg/dL    LDL 77 <=129 mg/dL    Non-HDL Cholesterol 95 mg/dL    Cholesterol/ HDL Ratio 2.5 <=4.4   CBC WITH AUTOMATED DIFFERENTIAL (PERFORMABLE ONLY)    Collection Time: 12/07/22  7:32 AM   Result Value Ref Range    WBC 6.5 4.2 - 11.0 K/mcL    RBC 3.95 (L) 4.50 - 5.90 mil/mcL    HGB 14.4 13.0 - 17.0 g/dL    HCT 41.3 39.0 - 51.0 %    .6 (H) 78.0 - 100.0 fl    MCH 36.5 (H) 26.0 - 34.0 pg    MCHC 34.9 32.0 - 36.5 g/dL    RDW-CV 12.8 11.0 - 15.0 %    RDW-SD 48.7 39.0 - 50.0 fL     140 - 450 K/mcL    NRBC 0 <=0 /100 WBC    Neutrophil, Percent 62 %    Lymphocytes, Percent 26 %    Mono,  Percent 9 %    Eosinophils, Percent 2 %    Basophils, Percent 1 %    Immature Granulocytes 0 %    Absolute Neutrophils 4.0 1.8 - 7.7 K/mcL    Absolute Lymphocytes 1.7 1.0 - 4.0 K/mcL    Absolute Monocytes 0.6 0.3 - 0.9 K/mcL    Absolute Eosinophils  0.2 0.0 - 0.5 K/mcL    Absolute Basophils 0.1 0.0 - 0.3 K/mcL    Absolute Immmature Granulocytes 0.0 0.0 - 0.2 K/mcL   ]  ECG done in clinic is normal.   ASSESSMENT/PLAN     Exertional dyspnea  (primary encounter diagnosis)  Plan: ELECTROCARDIOGRAM 12-LEAD    Benign hypertension with chronic kidney disease, stage III (CMS/HCC)  Stable kidney fxn, stable BP control.   Plan: CBC WITH DIFFERENTIAL, COMPREHENSIVE METABOLIC         PANEL    Fatty infiltration of liver  Due to alcohol most likely  Plan: LIPID PANEL WITH REFLEX  Try again to quit drinking or at least reduce the amount.     Mild emphysema (CMS/HCC)  Finding on CT  Plan continue the Wixela inhaler.     Coronary artery calcification seen on CAT scan  Had normal coronary CTA reported in 2014 at time of his pericarditis and no coronary calcium was reported on that exam.     Simple chronic bronchitis (CMS/HCC)  Needs renewal, dose adjustment.   Plan: fluticasone-salmeterol 250-50 MCG/ACT inhaler    RTC 3 mos and retest lab.

## 2024-03-20 ENCOUNTER — TELEPHONE (OUTPATIENT)
Dept: NEPHROLOGY | Facility: CLINIC | Age: 69
End: 2024-03-20
Payer: MEDICARE

## 2024-03-20 DIAGNOSIS — E11.21 DIABETIC NEPHROPATHY ASSOCIATED WITH TYPE 2 DIABETES MELLITUS: Primary | ICD-10-CM

## 2024-03-20 RX ORDER — DAPAGLIFLOZIN 10 MG/1
10 TABLET, FILM COATED ORAL DAILY
Qty: 90 TABLET | Refills: 3 | Status: SHIPPED | OUTPATIENT
Start: 2024-03-20

## 2024-03-20 NOTE — TELEPHONE ENCOUNTER
----- Message from Donna Clark sent at 3/20/2024  2:11 PM CDT -----  Regarding: MEDICATION  PATIENT WOULD LIKE TO SPEAK WITH NURSE BECAUSE HE SAYS THE MEDICATION IS TOO EXPENSIVE. CALL BACK NUMBER IS [phone].

## 2024-03-20 NOTE — TELEPHONE ENCOUNTER
Spoke w/ , he stated that the jardince was 125$. Going to try farxiga, if still too expensive, we will give samples.

## 2024-03-20 NOTE — TELEPHONE ENCOUNTER
----- Message from Ginny Miller DO sent at 3/20/2024  9:48 AM CDT -----  Findings consistent with CKD IV. Protein in his urine. Jardiance should help with this. Some DUKE. Recommend daily OTC iron supplement. TY

## 2024-03-22 ENCOUNTER — TELEPHONE (OUTPATIENT)
Dept: NEPHROLOGY | Facility: CLINIC | Age: 69
End: 2024-03-22
Payer: MEDICARE

## 2024-03-22 NOTE — TELEPHONE ENCOUNTER
----- Message from Vivien Jose sent at 3/22/2024 11:41 AM CDT -----  Needs to speak with nurse. He wasn't able to get that medicine. Call back # [phone]

## 2024-03-22 NOTE — TELEPHONE ENCOUNTER
Spoke w/  and let him know that I will ask  about getting him some samples but it would be Monday before I knew bc  is gone for the day. He verbalized that was okay.

## 2024-03-25 ENCOUNTER — TELEPHONE (OUTPATIENT)
Dept: NEPHROLOGY | Facility: CLINIC | Age: 69
End: 2024-03-25
Payer: MEDICARE

## 2024-03-25 NOTE — TELEPHONE ENCOUNTER
----- Message from Ginny Miller DO sent at 3/25/2024  8:35 AM CDT -----  yes  ----- Message -----  From: Sara Ball LPN  Sent: 3/22/2024   1:30 PM CDT  To: Ginny Miller, DO    Can I give this patient samples of farxiga? Im also going to give him a pt assistance form for farxiga.  ----- Message -----  From: Vivien Jose  Sent: 3/22/2024  11:43 AM CDT  To: Paul Gross Staff    Needs to speak with nurse. He wasn't able to get that medicine. Call back # [phone]

## 2024-03-25 NOTE — TELEPHONE ENCOUNTER
[place] Discharge Day Visit    Pregnancy    Information for the patient's mother:  Radha Brown [[id number]]   33 year old  OB History    Para Term  AB Living   3 3 2 1 0 3   SAB TAB Ectopic Molar Multiple Live Births   0 0 0 0 0 3       Estimated date of delivery   Information for the patient's mother:  Radha Brown [[id number]]   2017, by Ultrasound       Information for the patient's mother:  Radha Brown [[id number]]   No results found for this or any previous visit.    No antibiotics needed.    Information for the patient's mother:  Radha Brown [[id number]]     Current Facility-Administered Medications   Medication   • naLOXone (NARCAN) 1 mg in sodium chloride 0.9 % 250 mL standard concentration infusion   • ondansetron (ZOFRAN) injection 4 mg   • prochlorperazine (COMPAZINE) tablet 5 mg   • prochlorperazine (COMPAZINE) injection 5 mg   • acetaminophen (TYLENOL) tablet 325 mg   • ibuprofen (MOTRIN) tablet 600 mg   • acetaminophen (TYLENOL) tablet 650 mg   • HYDROcodone-acetaminophen (NORCO) 5-325 MG per tablet 1-2 tablet   • measles-mumps-rubella (M-M-R II) vaccine 0.5 mL   • varicella virus (VARIVAX) live vaccine 0.5 mL   • diphtheria-pertussis (acellular)-tetanus (BOOSTRIX) vaccine 0.5 mL   • ferrous sulfate (65 mg Fe per 325 mg) tablet 325 mg   • oxytocin (PITOCIN) 30 Units in sodium chloride 0.9% 500 mL   • nalbuphine (NUBAIN) injection 5 mg   • simethicone (MYLICON) tablet 80 mg   • calcium carbonate (TUMS) chewable tablet 500 mg   • docusate sodium-sennosides (SENOKOT S) 50-8.6 MG 2 tablet   • ketorolac injection 30 mg       Prenatal labs:   Information for the patient's mother:  Radha Brown [[id number]]     HEP B Surface AG   Date Value Ref Range Status   2019 NEGATIVE NEGATIVE Final     ABO/RH(D)   Date Value Ref Range Status   2019 A POSITIVE  Final     HIV Antigen/Antibody Screen   Date Value Ref Range Status   2019 NONREACTIVE NONREACTIVE Final     Rubella Antibody IgG  Patient presented to clinic today to receive samples. Samples giver per veral from . LOT ([id number]) EXP (10/13/2026).     Date Value Ref Range Status   2019 473.6 >9.9 Units/mL Final     Comment:     <5.0 Units/mL = Negative for IgG antibodies (Non Immune)  5.0 to 9.9 Units/mL = Equivocal (Non Immune)  >9.9 Units/mL = Immune     Result does not represent an antibody titer.        Information for the patient's mother:  Radha Brown [[id number]]     TREPONEMA PALLIDUM AB   Date Value Ref Range Status   2019 NONREACTIVE NONREACTIVE Final     Comment:     See Directory of Services for interpretation of syphilis testing algorithm.     Herpes: No  Pregnancy complications: No    Labor  Delivery Method:    Rupture Date:    Rupture Time:    Time of Birth: 9:15 AM      Apgars:   Totals: 9  9      Date of Delivery: 2019; Time of Delivery: Time of Birth: 9:15 AM     Delivery Type: Delivery Method:    Patient is a 2 day old female infant, delivered at Gestational Age: 36w6d.    Feeding Method: both breast and bottle    Infant Blood Type: not done     Screening Done: Yes   cardiac screen: 98,100  Hearing exam:  Hearing Test Machine: Auditory Brainstem Response (Algo) (19)  [place] Hearing Test Results: Pass R;Pass L (19)   Bowel Movement: Yes   Voids: Yes     PHYSICAL EXAM  Visit Vitals  Pulse 104   Temp 99 °F (37.2 °C)   Resp 34   Ht 19.25\" (48.9 cm)   Wt 2.684 kg   HC 32.4 cm (12.75\")   SpO2 100%   BMI 11.23 kg/m²     GENERAL:  Girl is an alert, vigorous female with appropriate behavior. she is in no acute distress.  SKIN:  Skin is warm with normal turgor. The color of the skin is normal. There is no rash. There are no bruises or other signs of injury. No significant jaundice.  HEAD:  The head is atraumatic and normocephalic. The anterior fontanel is open and flat; the posterior fontanel is open.  EYES:  The conjunctivae appear normal with neither icterus nor subconjunctival hemorrhage. Red reflexes are seen bilaterally.  EARS:  Pinnae and external ear canals normal. No preauricular  pits.  NOSE:  There is no nasal flaring, nares patent bilaterally.  THROAT:  The oropharynx is normal. There is no cleft of the palate.  NECK:  Clavicles without crepitus. No pits.  TRUNK:  There are no lesions on the trunk; there is no dimple over the presacral area. Spine appears normal.  LUNGS:  The lung fields are clear to auscultation.  HEART:  The precordium is quiet. The heart rhythm is grossly regular. S1 and S2 are normal. There are no murmurs. The femoral pulses are normal without delay.  ABDOMEN:  The umbilical cord stump is normal. There is not an umbilical hernia. The abdomen is flat and soft.   GENITALIA:  normal female genitalia  RECTAL:  Anus patent  EXTREMITIES:  Moving all 4 extremities. Ortolani and Messer are normal. There are no hip clicks.    NEUROLOGIC:  Displays normal tone throughout. she is not jittery and has normal  reflexes. Good suck, root, [place], and grasp x4.    Discharge Exam Summary:  Discharge Weight:   Weight    19 0915 19 0938 19 0945 19 0245   Weight: 2.92 kg 2.92 kg 2.776 kg 2.684 kg     Weight changes since birth: -8%    Diagnoses:  Delivery Method:    Csection    Procedures  none    Plan:  Date of Discharge: 2019    Medications:  Current Facility-Administered Medications   Medication Dose Route Frequency Provider Last Rate Last Dose   • dextrose (GLUTOSE) 0.4 g/mL (40%) gel 1.5 mL  0.5 mL/kg Buccal PRN Taryn Hutton MD   1.5 mL at 19 1052   • hepatitis B immune globulin (NABI-HB,HYPERHEP B) injection 0.5 mL  0.5 mL Intramuscular Once PRN Taryn Hutton MD           Social:  Car Seat: Yes    Follow up:  Follow up Appointment Date: 2-3 days  Special Instructions: none

## 2024-05-13 DIAGNOSIS — B37.9 YEAST INFECTION: Primary | ICD-10-CM

## 2024-05-13 RX ORDER — FLUCONAZOLE 150 MG/1
150 TABLET ORAL DAILY
Qty: 1 TABLET | Refills: 0 | Status: SHIPPED | OUTPATIENT
Start: 2024-05-13 | End: 2024-05-14

## 2024-05-13 NOTE — TELEPHONE ENCOUNTER
----- Message from Ginny Miller DO sent at 5/13/2024  2:26 PM CDT -----  Fluconazole 150 mg x 1  ----- Message -----  From: Sara Ball LPN  Sent: 5/13/2024  12:46 PM CDT  To: Ginny Miller DO    Spoke w/  and he states that he's pretty sure that he has a yeast infection since starting the jardiance. What do you want to do for him?  ----- Message -----  From: Tonya Bobby  Sent: 5/13/2024  11:20 AM CDT  To: Paul Gross Staff    Patient called needing to speak to a nurse about a medicine that was prescribed to him. A good phone number to reach him is [phone]

## 2024-07-29 RX ORDER — INSULIN GLARGINE 100 [IU]/ML
20 INJECTION, SOLUTION SUBCUTANEOUS NIGHTLY
COMMUNITY
Start: 2024-04-04

## 2024-07-30 ENCOUNTER — OFFICE VISIT (OUTPATIENT)
Dept: NEPHROLOGY | Facility: CLINIC | Age: 69
End: 2024-07-30
Payer: MEDICARE

## 2024-07-30 VITALS
OXYGEN SATURATION: 99 % | RESPIRATION RATE: 17 BRPM | HEART RATE: 74 BPM | BODY MASS INDEX: 32.64 KG/M2 | SYSTOLIC BLOOD PRESSURE: 122 MMHG | DIASTOLIC BLOOD PRESSURE: 72 MMHG | WEIGHT: 241 LBS | HEIGHT: 72 IN

## 2024-07-30 DIAGNOSIS — E08.21 DIABETIC NEPHROPATHY ASSOCIATED WITH DIABETES MELLITUS DUE TO UNDERLYING CONDITION: ICD-10-CM

## 2024-07-30 DIAGNOSIS — N18.4 CKD (CHRONIC KIDNEY DISEASE), STAGE IV: Primary | ICD-10-CM

## 2024-07-30 DIAGNOSIS — I12.9 HYPERTENSIVE NEPHROSCLEROSIS, STAGE 1 THROUGH STAGE 4 OR UNSPECIFIED CHRONIC KIDNEY DISEASE: ICD-10-CM

## 2024-07-30 DIAGNOSIS — I10 ESSENTIAL HYPERTENSION: ICD-10-CM

## 2024-07-30 PROCEDURE — 4010F ACE/ARB THERAPY RXD/TAKEN: CPT | Mod: CPTII,,, | Performed by: INTERNAL MEDICINE

## 2024-07-30 PROCEDURE — 1159F MED LIST DOCD IN RCRD: CPT | Mod: CPTII,,, | Performed by: INTERNAL MEDICINE

## 2024-07-30 PROCEDURE — 99999 PR PBB SHADOW E&M-EST. PATIENT-LVL V: CPT | Mod: PBBFAC,,, | Performed by: INTERNAL MEDICINE

## 2024-07-30 PROCEDURE — 99215 OFFICE O/P EST HI 40 MIN: CPT | Mod: PBBFAC | Performed by: INTERNAL MEDICINE

## 2024-07-30 PROCEDURE — 99214 OFFICE O/P EST MOD 30 MIN: CPT | Mod: S$PBB,,, | Performed by: INTERNAL MEDICINE

## 2024-07-30 PROCEDURE — 3288F FALL RISK ASSESSMENT DOCD: CPT | Mod: CPTII,,, | Performed by: INTERNAL MEDICINE

## 2024-07-30 PROCEDURE — 1125F AMNT PAIN NOTED PAIN PRSNT: CPT | Mod: CPTII,,, | Performed by: INTERNAL MEDICINE

## 2024-07-30 PROCEDURE — 1101F PT FALLS ASSESS-DOCD LE1/YR: CPT | Mod: CPTII,,, | Performed by: INTERNAL MEDICINE

## 2024-07-30 PROCEDURE — 3062F POS MACROALBUMINURIA REV: CPT | Mod: CPTII,,, | Performed by: INTERNAL MEDICINE

## 2024-07-30 PROCEDURE — 3066F NEPHROPATHY DOC TX: CPT | Mod: CPTII,,, | Performed by: INTERNAL MEDICINE

## 2024-07-30 PROCEDURE — 3008F BODY MASS INDEX DOCD: CPT | Mod: CPTII,,, | Performed by: INTERNAL MEDICINE

## 2024-07-30 PROCEDURE — 3074F SYST BP LT 130 MM HG: CPT | Mod: CPTII,,, | Performed by: INTERNAL MEDICINE

## 2024-07-30 PROCEDURE — 3078F DIAST BP <80 MM HG: CPT | Mod: CPTII,,, | Performed by: INTERNAL MEDICINE

## 2024-07-30 RX ORDER — TADALAFIL 10 MG/1
TABLET ORAL
COMMUNITY
Start: 2024-06-17

## 2024-07-30 RX ORDER — SITAGLIPTIN 50 MG/1
TABLET, FILM COATED ORAL
COMMUNITY
Start: 2024-06-20

## 2024-07-30 RX ORDER — AMLODIPINE BESYLATE 5 MG/1
TABLET ORAL
COMMUNITY
Start: 2024-06-17

## 2024-07-30 RX ORDER — SEMAGLUTIDE 0.68 MG/ML
INJECTION, SOLUTION SUBCUTANEOUS
COMMUNITY
Start: 2024-06-20

## 2024-07-30 RX ORDER — FLUCONAZOLE 150 MG/1
TABLET ORAL
COMMUNITY
Start: 2024-06-17

## 2024-07-30 NOTE — PROGRESS NOTES
[facility] History and Physical  Patient Name: Fredy Clement  MRN: [id number]  Age: 69 y.o.  : 1955    Date: 2024 2:52 PM    Chief Complaint: Follow up    HPI :   Mr Clement presents to Nephrology clinic for follow up. He is followed by CAROLE Tellez as his primary care provider.     HTN: diagnosed in . Current regimen includes amlodipine 10 mg daily, carvedilol 25 mg b.i.d., Lasix 20 mg daily, losartan 25 mg daily. Elevated today.     DM2: diagnosed in . Current regimen includes glipizide, farxiga started by myself last visit.      Hx of PE: on coumadin    Hx of stress urinary incontinence following a robotic assisted lap prostatectomy. Patient had artificial urinary sphincter placed by Dr. Fermin. No further issues with urination.     Nephrology history: No FH of kidney disease, no nephrolithiasis, or recurrent UTIs. No OTC medications including NSAIDs. Prior records from 10/2023 with eGFR 29, serum creatinine >2. He was previously see with Dr. Krishnamurthy. He wishes to transfer his to [facility].     Patient denies any CP, SOB, peripheral edema, dysuria, hematuria, changes in urinary habits, or increased frequency of urination. Yeast infection on farxiga twice.     Past Medical History:  has a past medical history of Cancer, CHF (congestive heart failure), Diabetes mellitus, History of DVT (deep vein thrombosis), Hyperlipidemia, Hypertension, and Kidney disease.     Past Surgical History:   has a past surgical history that includes Prostatectomy.     Family History:  family history includes Diabetes in his brother. No family history of kidney disease.     Social History:   reports that he has been smoking. He has never used smokeless tobacco. He reports that he does not currently use alcohol. He reports that he does not use drugs.     Allergies: has No Known Allergies.     Medications: Reviewed including OTC medications, herbal supplements, and  NSAIDS.     Old records have been reviewed.      Review of Systems:  ROS: A 10 point ROS was completed and found to be negative except for that mentioned above.          Physical Exam:  Vitals:    07/30/24 1544   BP: 122/72   Pulse: 74   Resp: 17         Constitutional: sitting in chair, in NAD  Eyes: EOMI, white sclera  ENMT: moist mucus membranes, nares patent  Cardiovascular: normal rate, S1/S2 noted, no edema  Respiratory: symmetrical chest expansion, CTA-B  Gastrointestinal: +BS, soft, NT/ND  Musculoskeletal: normal, no joint erythema/effusions  Skin: no rash, no purpura, warm extremities  Neurological: Alert and Oriented x 4, afocal    Labs:   Lab Results   Component Value Date     07/30/2024    K 3.8 07/30/2024    CREATININE 3.06 (H) 07/30/2024    WBC 8.22 07/30/2024    HGB 11.5 (L) 07/30/2024    HCT 37.0 (L) 07/30/2024     07/30/2024        Otherwise Reviewed    Assessment/Plan:       CKD stage IV in setting of diabetic nephropathy, hypertensive nephrosclerosis. Baseline sCr TBD, today sCr pending. Counseled to avoid nephrotoxic agents such as NSAIDs.     Proteinuria: urine Prot:Creat ratio is pending. Patient is on RAAS blockade with ARB.     Anemia: CBC pending    BMD: Calcium and Phosphorus PTH, Vit D pending    HTN: well controlled with current meds     DM type : on ARB, SGLT2i. Agree with both. Had some yeast infections. He will DC if he is unable to tolerate.     RTC 6  months with CBC, RFP, UA, urine for Prot:creat ratio, PTH, Vit D      Alisha S. Parker, DO [facility]   07/31/2024

## 2024-07-31 PROBLEM — E08.21 DIABETIC NEPHROPATHY ASSOCIATED WITH DIABETES MELLITUS DUE TO UNDERLYING CONDITION: Status: ACTIVE | Noted: 2024-07-31

## 2024-08-01 ENCOUNTER — TELEPHONE (OUTPATIENT)
Dept: NEPHROLOGY | Facility: CLINIC | Age: 69
End: 2024-08-01
Payer: MEDICARE

## 2024-08-01 NOTE — TELEPHONE ENCOUNTER
----- Message from Ginny Miller DO sent at 7/31/2024 11:41 AM CDT -----  Renal function stable. [facility]- recommend daily oral iron supplement.

## 2024-11-14 DIAGNOSIS — E11.21 TYPE 2 DIABETES MELLITUS WITH PROTEINURIC DIABETIC NEPHROPATHY: ICD-10-CM

## 2024-11-14 DIAGNOSIS — N18.4 CKD (CHRONIC KIDNEY DISEASE), STAGE IV: Primary | ICD-10-CM

## 2024-11-14 RX ORDER — DAPAGLIFLOZIN 10 MG/1
10 TABLET, FILM COATED ORAL DAILY
Qty: 90 TABLET | Refills: 3 | Status: SHIPPED | OUTPATIENT
Start: 2024-11-14

## 2025-01-30 ENCOUNTER — OFFICE VISIT (OUTPATIENT)
Dept: NEPHROLOGY | Facility: CLINIC | Age: 70
End: 2025-01-30
Payer: MEDICARE

## 2025-01-30 VITALS
DIASTOLIC BLOOD PRESSURE: 72 MMHG | HEART RATE: 68 BPM | RESPIRATION RATE: 18 BRPM | BODY MASS INDEX: 34 KG/M2 | SYSTOLIC BLOOD PRESSURE: 116 MMHG | WEIGHT: 251 LBS | OXYGEN SATURATION: 99 % | HEIGHT: 72 IN

## 2025-01-30 DIAGNOSIS — E08.21 DIABETIC NEPHROPATHY ASSOCIATED WITH DIABETES MELLITUS DUE TO UNDERLYING CONDITION: ICD-10-CM

## 2025-01-30 DIAGNOSIS — N18.4 CKD (CHRONIC KIDNEY DISEASE), STAGE IV: Primary | ICD-10-CM

## 2025-01-30 DIAGNOSIS — I12.9 HYPERTENSIVE NEPHROSCLEROSIS, STAGE 1 THROUGH STAGE 4 OR UNSPECIFIED CHRONIC KIDNEY DISEASE: ICD-10-CM

## 2025-01-30 PROCEDURE — 1101F PT FALLS ASSESS-DOCD LE1/YR: CPT | Mod: CPTII,,, | Performed by: NURSE PRACTITIONER

## 2025-01-30 PROCEDURE — 1159F MED LIST DOCD IN RCRD: CPT | Mod: CPTII,,, | Performed by: NURSE PRACTITIONER

## 2025-01-30 PROCEDURE — 1126F AMNT PAIN NOTED NONE PRSNT: CPT | Mod: CPTII,,, | Performed by: NURSE PRACTITIONER

## 2025-01-30 PROCEDURE — 3066F NEPHROPATHY DOC TX: CPT | Mod: CPTII,,, | Performed by: NURSE PRACTITIONER

## 2025-01-30 PROCEDURE — 99213 OFFICE O/P EST LOW 20 MIN: CPT | Mod: S$PBB,,, | Performed by: NURSE PRACTITIONER

## 2025-01-30 PROCEDURE — 99999 PR PBB SHADOW E&M-EST. PATIENT-LVL III: CPT | Mod: PBBFAC,,, | Performed by: NURSE PRACTITIONER

## 2025-01-30 PROCEDURE — 99213 OFFICE O/P EST LOW 20 MIN: CPT | Mod: PBBFAC | Performed by: NURSE PRACTITIONER

## 2025-01-30 PROCEDURE — 3074F SYST BP LT 130 MM HG: CPT | Mod: CPTII,,, | Performed by: NURSE PRACTITIONER

## 2025-01-30 PROCEDURE — 3288F FALL RISK ASSESSMENT DOCD: CPT | Mod: CPTII,,, | Performed by: NURSE PRACTITIONER

## 2025-01-30 PROCEDURE — 3008F BODY MASS INDEX DOCD: CPT | Mod: CPTII,,, | Performed by: NURSE PRACTITIONER

## 2025-01-30 PROCEDURE — 3078F DIAST BP <80 MM HG: CPT | Mod: CPTII,,, | Performed by: NURSE PRACTITIONER

## 2025-01-30 RX ORDER — TADALAFIL 20 MG/1
20 TABLET ORAL
COMMUNITY
Start: 2024-10-03

## 2025-01-30 RX ORDER — OXYBUTYNIN CHLORIDE 5 MG/1
5 TABLET, EXTENDED RELEASE ORAL
COMMUNITY
Start: 2024-08-21

## 2025-01-30 RX ORDER — OXYBUTYNIN CHLORIDE 5 MG/1
5 TABLET, EXTENDED RELEASE ORAL
COMMUNITY
Start: 2024-12-18

## 2025-01-30 RX ORDER — TRIAMCINOLONE ACETONIDE 5 MG/G
CREAM TOPICAL
COMMUNITY
Start: 2024-12-16

## 2025-01-30 NOTE — PROGRESS NOTES
[facility] History and Physical  Patient Name: Fredy Clement  MRN: [id number]  Age: 70 y.o.  : 1955    Date: 2025 2:52 PM    Chief Complaint: Follow up    HPI :   Mr Clement presents to Nephrology clinic for follow up. He is followed by CAROLE Tellez as his primary care provider.     HTN: diagnosed in . Current regimen includes amlodipine 5 mg daily, carvedilol 25 mg b.i.d., Lasix 20 mg daily, losartan 25 mg daily.      DM2: diagnosed in . Current regimen includes glipizide, farxiga, insulin.     Hx of PE: on coumadin    Hx of stress urinary incontinence following a robotic assisted lap prostatectomy. Patient had artificial urinary sphincter placed by Dr. Fermin. No further issues with urination.     Nephrology history: No FH of kidney disease, no nephrolithiasis, or recurrent UTIs. No OTC medications including NSAIDs. Prior records from 10/2023 with eGFR 29, serum creatinine >2. He was previously see with Dr. Krishnamurthy. He wishes to transfer his to [facility].     Patient denies any CP, SOB, peripheral edema, dysuria, hematuria, changes in urinary habits, or increased frequency of urination. Yeast infection on farxiga twice.     Past Medical History:  has a past medical history of Cancer, CHF (congestive heart failure), Diabetes mellitus, History of DVT (deep vein thrombosis), Hyperlipidemia, Hypertension, and Kidney disease.     Past Surgical History:   has a past surgical history that includes Prostatectomy.     Family History:  family history includes Diabetes in his brother. No family history of kidney disease.     Social History:   reports that he has been smoking. He has never used smokeless tobacco. He reports that he does not currently use alcohol. He reports that he does not use drugs.     Allergies: has No Known Allergies.     Medications: Reviewed including OTC medications, herbal supplements, and NSAIDS.     Old records have been  reviewed.      Review of Systems:  ROS: A 10 point ROS was completed and found to be negative except for that mentioned above.          Physical Exam:  Vitals:    01/30/25 1352   BP: 116/72   Pulse: 68   Resp: 18       Constitutional: sitting in chair, in NAD  Eyes: EOMI, white sclera  ENMT: moist mucus membranes, nares patent  Cardiovascular: normal rate, S1/S2 noted, no edema  Respiratory: symmetrical chest expansion, CTA-B  Gastrointestinal: +BS, soft, NT/ND  Musculoskeletal: normal, no joint erythema/effusions  Skin: no rash, no purpura, warm extremities  Neurological: Alert and Oriented x 3, afocal    Labs:   Lab Results   Component Value Date     07/30/2024    K 3.8 07/30/2024    CREATININE 3.06 (H) 07/30/2024    HGBA1C 7.7 05/02/2023    WBC 8.22 07/30/2024    HGB 11.5 (L) 07/30/2024    HCT 37.0 (L) 07/30/2024     07/30/2024        Otherwise Reviewed    Assessment/Plan:       CKD stage IV in setting of diabetic nephropathy, hypertensive nephrosclerosis. Baseline sCr TBD, today sCr pending. Counseled to avoid nephrotoxic agents such as NSAIDs.     Proteinuria: urine Prot:Creat ratio is pending. Patient is on RAAS blockade with ARB.     Anemia: CBC pending    BMD: Calcium and Phosphorus PTH, Vit D pending    HTN: well controlled with current meds     DM type 2: on ARB, SGLT2i. Agree with both. Had some yeast infections. He will DC if he is unable to tolerate.     RTC 6  months with CBC, RFP, UA, urine for Prot:creat ratio, PTH, Vit D      Signature:             CAROLE Woodard  [facility]  [address] [facility] [address]  [phone]        20 minutes of total time spent on the encounter, which includes face to face time and non-face to face time preparing to see the patient (eg, review of tests), Obtaining and/or reviewing separately obtained history, Documenting clinical information in the electronic or other health record, Independently  interpreting results (not separately reported) and communicating results to the patient/family/caregiver, or Care coordination (not separately reported).       Date of encounter: 1/30/25

## 2025-02-03 DIAGNOSIS — E11.10 METABOLIC ACIDOSIS DUE TO DIABETES MELLITUS: Primary | ICD-10-CM

## 2025-02-03 DIAGNOSIS — E11.21 TYPE 2 DIABETES MELLITUS WITH PROTEINURIC DIABETIC NEPHROPATHY: ICD-10-CM

## 2025-02-03 DIAGNOSIS — E08.21 DIABETIC NEPHROPATHY ASSOCIATED WITH DIABETES MELLITUS DUE TO UNDERLYING CONDITION: ICD-10-CM

## 2025-02-03 RX ORDER — SODIUM BICARBONATE 650 MG/1
650 TABLET ORAL 2 TIMES DAILY
Qty: 90 TABLET | Refills: 3 | Status: SHIPPED | OUTPATIENT
Start: 2025-02-03 | End: 2025-08-02

## 2025-02-04 ENCOUNTER — TELEPHONE (OUTPATIENT)
Dept: NEPHROLOGY | Facility: CLINIC | Age: 70
End: 2025-02-04
Payer: MEDICARE

## 2025-02-04 DIAGNOSIS — N18.4 CKD (CHRONIC KIDNEY DISEASE), STAGE IV: Primary | ICD-10-CM

## 2025-02-04 NOTE — TELEPHONE ENCOUNTER
----- Message from CAROLE Merritt sent at 2/3/2025  8:09 AM CST -----  Please let patient know that kidney function has fallen slightly, still CKD IV but sCr has risen. His proteinuria has improved just mildly. He needs to be sure that he is keeping a good control of his glucoses and hydrating well with water. He needs to start Sodium Bicarb, I have sent Rx to [facility]. I would like for him to hydrate well and watch his diet to keep his glucoses in goal range and repeat his RFP in 2 weeks. Thank you!

## 2025-02-11 ENCOUNTER — OFFICE VISIT (OUTPATIENT)
Dept: DIABETES SERVICES | Facility: CLINIC | Age: 70
End: 2025-02-11
Payer: MEDICARE

## 2025-02-11 VITALS
HEART RATE: 73 BPM | HEIGHT: 72 IN | WEIGHT: 246 LBS | OXYGEN SATURATION: 96 % | SYSTOLIC BLOOD PRESSURE: 128 MMHG | DIASTOLIC BLOOD PRESSURE: 72 MMHG | BODY MASS INDEX: 33.32 KG/M2 | RESPIRATION RATE: 18 BRPM

## 2025-02-11 DIAGNOSIS — I10 ESSENTIAL HYPERTENSION: ICD-10-CM

## 2025-02-11 DIAGNOSIS — I11.0 HYPERTENSIVE HEART DISEASE WITH CHRONIC DIASTOLIC CONGESTIVE HEART FAILURE: ICD-10-CM

## 2025-02-11 DIAGNOSIS — E08.21 DIABETIC NEPHROPATHY ASSOCIATED WITH DIABETES MELLITUS DUE TO UNDERLYING CONDITION: Primary | ICD-10-CM

## 2025-02-11 DIAGNOSIS — N18.4 CKD (CHRONIC KIDNEY DISEASE), STAGE IV: ICD-10-CM

## 2025-02-11 DIAGNOSIS — E11.21 TYPE 2 DIABETES MELLITUS WITH PROTEINURIC DIABETIC NEPHROPATHY: ICD-10-CM

## 2025-02-11 DIAGNOSIS — E78.5 HYPERLIPIDEMIA, UNSPECIFIED HYPERLIPIDEMIA TYPE: ICD-10-CM

## 2025-02-11 DIAGNOSIS — E11.10 METABOLIC ACIDOSIS DUE TO DIABETES MELLITUS: ICD-10-CM

## 2025-02-11 DIAGNOSIS — I50.32 HYPERTENSIVE HEART DISEASE WITH CHRONIC DIASTOLIC CONGESTIVE HEART FAILURE: ICD-10-CM

## 2025-02-11 LAB
GLUCOSE SERPL-MCNC: 319 MG/DL (ref 70–110)
HBA1C MFR BLD: 7.4 % (ref 4.5–6.6)

## 2025-02-11 PROCEDURE — 82962 GLUCOSE BLOOD TEST: CPT | Mod: PBBFAC | Performed by: NURSE PRACTITIONER

## 2025-02-11 PROCEDURE — 83036 HEMOGLOBIN GLYCOSYLATED A1C: CPT | Mod: PBBFAC | Performed by: NURSE PRACTITIONER

## 2025-02-11 PROCEDURE — 99215 OFFICE O/P EST HI 40 MIN: CPT | Mod: PBBFAC | Performed by: NURSE PRACTITIONER

## 2025-02-11 RX ORDER — DAPAGLIFLOZIN 10 MG/1
10 TABLET, FILM COATED ORAL DAILY
Qty: 90 TABLET | Refills: 3 | Status: SHIPPED | OUTPATIENT
Start: 2025-02-11

## 2025-02-11 RX ORDER — GLIPIZIDE 10 MG/1
10 TABLET ORAL 2 TIMES DAILY
Qty: 180 TABLET | Refills: 0 | Status: SHIPPED | OUTPATIENT
Start: 2025-02-11 | End: 2025-05-12

## 2025-02-11 RX ORDER — INSULIN GLARGINE 100 [IU]/ML
20 INJECTION, SOLUTION SUBCUTANEOUS NIGHTLY
Qty: 3 EACH | Refills: 2 | Status: SHIPPED | OUTPATIENT
Start: 2025-02-11

## 2025-02-11 NOTE — PROGRESS NOTES
Subjective:         Patient ID: Fredy Clement is a 70 y.o. male.  Patient's current PCP is Jamarcus Lao MD.     Chief Complaint: Diabetes Mellitus (Patient is here to establish care. Glucose and A1C check. Patient has been a diabetic since 1998. He is checking glucose two times daily. Patient c/o issues controlling glucose. )    HPI  Fredy Clement is a 70 y.o. Black or  male presenting for a new consult for diabetes. Patient has been diagnosed with type 2 diabetes for several years.  Received diabetes education:    CURRENT DM MEDICATIONS:   Diabetes Medications               dapagliflozin propanediol (FARXIGA) 10 mg tablet Take 1 tablet (10 mg total) by mouth once daily.    glipiZIDE (GLUCOTROL) 10 MG tablet Take 10 mg by mouth 2 (two) times a day.    insulin glargine (LANTUS) 100 unit/mL injection Inject 20 Units into the skin Daily.    JANUVIA 50 mg Tab Take by mouth.    LANTUS SOLOSTAR U-100 INSULIN 100 unit/mL (3 mL) InPn pen Inject 20 Units into the skin every evening.    OZEMPIC 0.25 mg or 0.5 mg (2 mg/3 mL) pen injector Inject into the skin.              Past failed treatment include:     Blood glucose testing is performed regularly. Patient is testing 2 times per day.  Meter:   Preferred lab:    Any episodes of hypoglycemia? no     Complications related to diabetes: nephropathy, autonomic neuropathy, and cardiovascular disease    His blood sugar in the clinic today was:   Lab Results   Component Value Date    POCGLU 319 (A) 02/11/2025       Fredy Clement presents today for follow up visit to discuss diabetes management.   His glucose in the AM when he wakes up is .     He checks his glucose twice daily   He had the flu last week.     Current diet: does not follow a diabetic diet   Activity Level: sedentary     Lab Results   Component Value Date    HGBA1C 7.4 (A) 02/11/2025    HGBA1C 7.7 05/02/2023       STANDARDS OF CARE  Diabetes Management Status    Statin: Taking  ACE/ARB:  "Taking    Screening or Prevention Patient's value Goal Complete/Controlled?   HgA1C Testing and Control   Lab Results   Component Value Date    HGBA1C 7.4 (A) 02/11/2025      Annually/Less than 8% Yes   Lipid profile Most Recent Lipid Panel Health Maintenance Topic Completion: Not Found Annually No   LDL control No results found for: "LDLCALC" Annually/Less than 100 mg/dl  No   Nephropathy screening Lab Results   Component Value Date    LABMICR 904.5 (H) 01/30/2025     Lab Results   Component Value Date    PROTEINUA 100 (A) 01/30/2025     No results found for: "UTPCR"   Annually Yes   Blood pressure BP Readings from Last 1 Encounters:   02/11/25 128/72    Less than 140/90 Yes   Dilated retinal exam Most Recent Eye Exam Date: Not Found  Last year-- primary eye care  Annually No   Foot exam   Most Recent Foot Exam Date: Not Found Annually No          Labs reviewed and are noted below.    Lab Results   Component Value Date    WBC 6.68 01/30/2025    HGB 11.8 (L) 01/30/2025    HCT 38.5 (L) 01/30/2025     01/30/2025     01/30/2025    K 4.3 01/30/2025     (H) 01/30/2025    ANIONGAP 9 01/30/2025    CREATININE 3.66 (H) 01/30/2025    BUN 29 (H) 01/30/2025    CO2 21 (L) 01/30/2025     (H) 01/30/2025    MICROALBUR 80.5 (H) 01/30/2025     Lab Results   Component Value Date    IRON 41 (L) 07/30/2024    TIBC 284 07/30/2024    FERRITIN 330 07/30/2024    .30 (H) 01/30/2025    CALCIUM 8.7 (L) 01/30/2025    PHOS 3.3 01/30/2025     No results found for: "CPEPTIDE"  No results found for: "GLUTAMICACID"  Glucose   Date Value Ref Range Status   01/30/2025 211 (H) 82 - 115 mg/dL Final     Anion Gap   Date Value Ref Range Status   01/30/2025 9 7 - 16 mmol/L Final       The following portions of the patient's history were reviewed and updated as appropriate: allergies, current medications, past family history, past medical history, past social history, past surgical history, and problem list.    Review of " patient's allergies indicates:  No Known Allergies  Social History     Socioeconomic History    Marital status:    Tobacco Use    Smoking status: Every Day    Smokeless tobacco: Never   Substance and Sexual Activity    Alcohol use: Not Currently    Drug use: Never     Past Medical History:   Diagnosis Date    Cancer     CHF (congestive heart failure)     Diabetes mellitus     History of DVT (deep vein thrombosis)     Hyperlipidemia     Hypertension     Kidney disease        REVIEW OF SYSTEMS:  Eyes No history of DR.  Cardiovascular: History of HTN and HLD   GI: Denies nausea,vomiting,constipation,or diarrhea.  : Denies dysuria.  SKIN: Denies rashes and lesions.  Neuro: No neuropathy.  PSYCH: No tobacco use.  ENDO: See HPI.        Objective:      Vitals:    02/11/25 0950   BP: 128/72   Pulse: 73   Resp: 18     RESPIRATORY: No respiratory distress  NEUROLOGIC: Cranial nerves II-XII grossly intact.   PSYCHIATRIC: Alert & oriented x3. Normal mood and affect.  FOOT EXAMINATION: deferred d/t time   Assessment:       1. Diabetic nephropathy associated with diabetes mellitus due to underlying condition    2. Metabolic acidosis due to diabetes mellitus    3. 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 CKD (chronic kidney disease), stage IV    5. Hypertensive heart disease with chronic diastolic congestive heart failure    6. Hyperlipidemia, unspecified hyperlipidemia type    7. 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  Dispense: 90 tablet; Refill: 3  -     Lipid Panel; Future; Expected date: 02/11/2025    CKD (chronic kidney disease), stage IV  -     dapagliflozin propanediol (FARXIGA) 10 mg tablet; Take 1 tablet (10 mg total) by mouth once daily.  Dispense: 90 tablet; Refill: 3    Hypertensive heart disease with chronic diastolic congestive heart failure    Hyperlipidemia, unspecified hyperlipidemia type    Essential hypertension    Other orders  -     glipiZIDE (GLUCOTROL) 10 MG tablet; Take 1 tablet (10 mg total) by mouth 2 (two) times a day.  Dispense: 180 tablet; Refill: 0  -     LANTUS SOLOSTAR U-100 INSULIN 100 unit/mL (3 mL) InPn pen; Inject 20 Units into the skin every evening.  Dispense: 3 each; Refill: 2        - Follow up: 3 months      Portions of this note may have been created with voice recognition software. Occasional "wrong-word" or "sound-a-like" substitutions may have occurred due to the inherent limitations of voice recognition software. Please, read the note carefully and recognize, using context, where substitutions have occurred.         Katherine FONSECAP/AQUILESP  [facility]     "

## 2025-02-11 NOTE — PATIENT INSTRUCTIONS
-- Medications adjusted for today's visit include:    Continue your medications as prescribed     Will refer to diabetic education     -- Limit carbs to no more than 30-45 grams with each meal. Never eat carbs by themselves, always add protein. Make snacks low carb or non-carb only.    -- Continue checking blood sugar 3 times daily: Fasting blood sugar and vary your next 2 readings: Before lunch, before supper, 2 hours after any meal, or bedtime.   -Blood sugar goals should be a fasting blood sugar between , and no blood sugars throughout the day over 180 is good, less than 160 better, less than 140 perfect.    --Carry glucose tablets/soft peppermints/regular juice or Coke product with you at all times to treat a low blood sugar episode (less than 70). If your blood sugar is between 50-70, Chew 4 tablets or drink 1/2 cup of juice or regular Coke product. If your blood sugar is below 50, double the treatment. Re-check blood sugar in 15 minutes. If still low, repeat this. Always call the clinic to give an update for any low blood sugar episodes.    --Exercise as tolerated: Goal 30 minutes/day, 5 days/week. Start slowly and increase as tolerated.    --Follow-up for your next visit in 3 months     --Please either drop off, fax, or MyChart your readings to me as needed.

## 2025-02-18 ENCOUNTER — TELEPHONE (OUTPATIENT)
Dept: NEPHROLOGY | Facility: CLINIC | Age: 70
End: 2025-02-18
Payer: MEDICARE

## 2025-02-20 ENCOUNTER — OFFICE VISIT (OUTPATIENT)
Dept: CARDIOLOGY | Facility: CLINIC | Age: 70
End: 2025-02-20
Payer: MEDICARE

## 2025-02-20 VITALS
HEART RATE: 69 BPM | SYSTOLIC BLOOD PRESSURE: 128 MMHG | DIASTOLIC BLOOD PRESSURE: 76 MMHG | WEIGHT: 250.63 LBS | HEIGHT: 72 IN | OXYGEN SATURATION: 99 % | BODY MASS INDEX: 33.95 KG/M2

## 2025-02-20 DIAGNOSIS — E78.5 HYPERLIPIDEMIA, UNSPECIFIED HYPERLIPIDEMIA TYPE: ICD-10-CM

## 2025-02-20 DIAGNOSIS — Z86.711 HISTORY OF PULMONARY EMBOLISM: ICD-10-CM

## 2025-02-20 DIAGNOSIS — I10 ESSENTIAL HYPERTENSION: ICD-10-CM

## 2025-02-20 DIAGNOSIS — I10 HYPERTENSION, UNSPECIFIED TYPE: Primary | ICD-10-CM

## 2025-02-20 PROCEDURE — 99214 OFFICE O/P EST MOD 30 MIN: CPT | Mod: S$PBB,,, | Performed by: NURSE PRACTITIONER

## 2025-02-20 PROCEDURE — 1101F PT FALLS ASSESS-DOCD LE1/YR: CPT | Mod: CPTII,,, | Performed by: NURSE PRACTITIONER

## 2025-02-20 PROCEDURE — 3066F NEPHROPATHY DOC TX: CPT | Mod: CPTII,,, | Performed by: NURSE PRACTITIONER

## 2025-02-20 PROCEDURE — 3288F FALL RISK ASSESSMENT DOCD: CPT | Mod: CPTII,,, | Performed by: NURSE PRACTITIONER

## 2025-02-20 PROCEDURE — 1160F RVW MEDS BY RX/DR IN RCRD: CPT | Mod: CPTII,,, | Performed by: NURSE PRACTITIONER

## 2025-02-20 PROCEDURE — 1159F MED LIST DOCD IN RCRD: CPT | Mod: CPTII,,, | Performed by: NURSE PRACTITIONER

## 2025-02-20 PROCEDURE — 1126F AMNT PAIN NOTED NONE PRSNT: CPT | Mod: CPTII,,, | Performed by: NURSE PRACTITIONER

## 2025-02-20 PROCEDURE — 3008F BODY MASS INDEX DOCD: CPT | Mod: CPTII,,, | Performed by: NURSE PRACTITIONER

## 2025-02-20 PROCEDURE — 3051F HG A1C>EQUAL 7.0%<8.0%: CPT | Mod: CPTII,,, | Performed by: NURSE PRACTITIONER

## 2025-02-20 PROCEDURE — 3062F POS MACROALBUMINURIA REV: CPT | Mod: CPTII,,, | Performed by: NURSE PRACTITIONER

## 2025-02-20 PROCEDURE — 3074F SYST BP LT 130 MM HG: CPT | Mod: CPTII,,, | Performed by: NURSE PRACTITIONER

## 2025-02-20 PROCEDURE — 99215 OFFICE O/P EST HI 40 MIN: CPT | Mod: PBBFAC,25 | Performed by: NURSE PRACTITIONER

## 2025-02-20 PROCEDURE — 93005 ELECTROCARDIOGRAM TRACING: CPT | Mod: PBBFAC | Performed by: INTERNAL MEDICINE

## 2025-02-21 LAB
OHS QRS DURATION: 86 MS
OHS QTC CALCULATION: 421 MS

## 2025-02-21 NOTE — ASSESSMENT & PLAN NOTE
Chronically anticoagulated with warfarin; inr followed by [facility]  The patient is interested in changing from warfarin to a NOAC. I have no issues with this but will defer to his PCP given he is on anticoagulation for a non-cardiac issue.

## 2025-02-21 NOTE — PROGRESS NOTES
"PCP: Jamarcus Lao MD    Referring Provider:   Chief Complaint   Patient presents with    Congestive Heart Failure    Hypertension    Palpitations     At night sometimes       Subjective:   Fredy Clement is a 70 y.o. male with hx of h/o PE (chronically anticoagulated with warfarin/INR followed by PCP), HTN, hypertensive heart disease, HLD, GERD, and CKD stage III,  who presents for routine follow up. HE reports that he is doing well and denies complaints. He is active and denies chest pain, pressure, heaviness, tightness or SOB.  He has recently had the flu but has recovered.    2/20/2024 presents for routine follow up. He states that he is doing well. He has palpitations "every now and then" but otherwise asymptomatic.         Fhx:  Family History   Problem Relation Name Age of Onset    Diabetes Brother       Shx:   Social History     Socioeconomic History    Marital status:    Tobacco Use    Smoking status: Every Day    Smokeless tobacco: Never   Substance and Sexual Activity    Alcohol use: Not Currently    Drug use: Never       EKG   2/20/2025 sinus bradycardia; HR 59 bpm, possible inferior infarct; anterolateral ST&TWA  2/20/2024 sinus bradycardia; HR 58 bpm; NS-TWA; when compared with EKG 11/10/2022 no significant change was found.    ECHO [facility] 4/2023  Results unavailable    [facility] No results found for this or any previous visit.        Lab Results   Component Value Date     01/30/2025    K 4.3 01/30/2025     (H) 01/30/2025    CO2 21 (L) 01/30/2025    BUN 29 (H) 01/30/2025    CREATININE 3.66 (H) 01/30/2025    CALCIUM 8.7 (L) 01/30/2025    ANIONGAP 9 01/30/2025       Lab Results   Component Value Date    CHOL 133 02/20/2025     Lab Results   Component Value Date    HDL 29 (L) 02/20/2025     Lab Results   Component Value Date    LDLCALC 85 02/20/2025     Lab Results   Component Value Date    TRIG 95 02/20/2025     Lab Results   Component Value Date    CHOLHDL 4.6 02/20/2025       Lab " "Results   Component Value Date    WBC 6.68 01/30/2025    HGB 11.8 (L) 01/30/2025    HCT 38.5 (L) 01/30/2025    MCV 91.2 01/30/2025     01/30/2025           Current Outpatient Medications:     ACCU-CHEK ARCADIO PLUS TEST STRP Strp, USE 1 STRIP TO CHECK GLUCOSE TWICE DAILY, Disp: , Rfl:     ACCU-CHEK SOFTCLIX LANCETS Misc, USE 1 TO CHECK GLUCOSE TWICE DAILY, Disp: , Rfl:     amLODIPine (NORVASC) 5 MG tablet, Take by mouth., Disp: , Rfl:     ascorbic acid, vitamin C, (VITAMIN C) 1000 MG tablet, Take 500 mg by mouth., Disp: , Rfl:     aspirin (ECOTRIN) 81 MG EC tablet, Take 81 mg by mouth., Disp: , Rfl:     atorvastatin (LIPITOR) 40 MG tablet, Take 1 tablet by mouth nightly., Disp: , Rfl:     BD ULTRA-FINE MICRO PEN NEEDLE 32 gauge x 1/4" Ndle, USE ONCE DAILY WITH FLEXPEN, Disp: , Rfl:     carvediloL (COREG) 25 MG tablet, Take 25 mg by mouth 2 (two) times a day., Disp: , Rfl:     dapagliflozin propanediol (FARXIGA) 10 mg tablet, Take 1 tablet (10 mg total) by mouth once daily., Disp: 90 tablet, Rfl: 3    glipiZIDE (GLUCOTROL) 10 MG tablet, Take 1 tablet (10 mg total) by mouth 2 (two) times a day., Disp: 180 tablet, Rfl: 0    LANTUS SOLOSTAR U-100 INSULIN 100 unit/mL (3 mL) InPn pen, Inject 20 Units into the skin every evening., Disp: 3 each, Rfl: 2    losartan (COZAAR) 25 MG tablet, Take 25 mg by mouth once daily., Disp: , Rfl:     omega 3-dha-epa-fish oil 1,000 mg (120 mg-180 mg) Cap, Fish Oil Omega-3 1000 MG Oral Capsule QTY: 0 capsule Days: 0 Refills: 0  Written: 02/16/23 Patient Instructions: qd, Disp: , Rfl:     oxybutynin (DITROPAN-XL) 5 MG TR24, Take 5 mg by mouth., Disp: , Rfl:     oxybutynin (DITROPAN-XL) 5 MG TR24, Take 5 mg by mouth., Disp: , Rfl:     pantoprazole (PROTONIX) 40 MG tablet, Take 40 mg by mouth Daily., Disp: , Rfl:     potassium chloride (MICRO-K) 10 MEQ CpSR, Take 10 mEq by mouth 2 (two) times daily., Disp: , Rfl:     sodium bicarbonate 650 MG tablet, Take 1 tablet (650 mg total) by mouth " 2 (two) times daily., Disp: 90 tablet, Rfl: 3    vitamin D (VITAMIN D3) 1000 units Tab, Take 5,000 Units by mouth., Disp: , Rfl:     warfarin (COUMADIN) 5 MG tablet, Take 5 mg by mouth Daily., Disp: , Rfl:     ammonium lactate (LAC-HYDRIN) 12 % lotion, APPLY LOTION TOPICALLY ONCE DAILY TO THE AFFECTED AREAS ON THE FEET (Patient not taking: Reported on 2/20/2025), Disp: , Rfl:     cholecalciferol, vitamin D3, 1,250 mcg (50,000 unit) capsule, Take 50,000 Units by mouth every 7 days. (Patient not taking: Reported on 2/20/2025), Disp: , Rfl:     ciclopirox (PENLAC) 8 % Soln, use as directed (Patient not taking: Reported on 2/20/2025), Disp: , Rfl:     fluconazole (DIFLUCAN) 150 MG Tab, TAKE 1 TABLET BY MOUTH ONCE EVERY 3 DAYS (Patient not taking: Reported on 2/20/2025), Disp: , Rfl:     JANUVIA 50 mg Tab, Take by mouth. (Patient not taking: Reported on 2/20/2025), Disp: , Rfl:     tadalafiL (CIALIS) 20 MG Tab, Take 20 mg by mouth. (Patient not taking: Reported on 2/20/2025), Disp: , Rfl:     triamcinolone acetonide 0.5% (KENALOG) 0.5 % Crea, APPLY CREAM TOPICALLY TWICE DAILY FOR 7 DAYS TO NECK (Patient not taking: Reported on 2/20/2025), Disp: , Rfl:   Meds reviewed but not  reconciled. He did not bring his meds.      Review of Systems   Respiratory:  Negative for shortness of breath.    Cardiovascular:  Positive for palpitations. Negative for chest pain, orthopnea, claudication, leg swelling and PND.        Occ palpations- none since last summer   Neurological:  Negative for dizziness, loss of consciousness and weakness.           Objective:   /76 (BP Location: Left arm, Patient Position: Sitting)   Pulse 69   Ht 6' (1.829 m)   Wt 113.7 kg (250 lb 9.6 oz)   SpO2 99%   BMI 33.99 kg/m²     Physical Exam  Vitals and nursing note reviewed.   Constitutional:       Appearance: Normal appearance. He is obese.   HENT:      Head: Normocephalic and atraumatic.   Neck:      Vascular: No carotid bruit.   Cardiovascular:       Rate and Rhythm: Normal rate and regular rhythm.      Pulses: Normal pulses.      Heart sounds: Normal heart sounds.   Pulmonary:      Effort: Pulmonary effort is normal.      Breath sounds: Normal breath sounds.   Abdominal:      Palpations: Abdomen is soft.   Musculoskeletal:      Cervical back: Neck supple.      Right lower leg: No edema.      Left lower leg: No edema.   Skin:     General: Skin is warm and dry.      Capillary Refill: Capillary refill takes less than 2 seconds.   Neurological:      Mental Status: He is alert.           Assessment:     1. Hypertension, unspecified type  EKG 12-lead    EKG 12-lead      2. Essential hypertension        3. Hyperlipidemia, unspecified hyperlipidemia type        4. History of pulmonary embolism              Plan:   Essential hypertension  128/76 mmHg    Hyperlipidemia  Lipids followed by [facility]  Lipitor 40 mg po daily    History of pulmonary embolism  Chronically anticoagulated with warfarin; inr followed by [facility]  The patient is interested in changing from warfarin to a NOAC. I have no issues with this but will defer to his PCP given he is on anticoagulation for a non-cardiac issue.    RTC: 1 year

## 2025-07-29 RX ORDER — IPRATROPIUM BROMIDE AND ALBUTEROL 20; 100 UG/1; UG/1
1 SPRAY, METERED RESPIRATORY (INHALATION)
COMMUNITY
End: 2025-07-30

## 2025-07-29 RX ORDER — LOVASTATIN 20 MG/1
TABLET ORAL
COMMUNITY
End: 2025-07-30

## 2025-07-29 RX ORDER — ALBUTEROL SULFATE 90 UG/1
2 INHALANT RESPIRATORY (INHALATION)
COMMUNITY
Start: 2025-02-02 | End: 2025-07-30

## 2025-07-29 RX ORDER — FUROSEMIDE 20 MG/1
10 TABLET ORAL
COMMUNITY

## 2025-07-30 ENCOUNTER — OFFICE VISIT (OUTPATIENT)
Dept: NEPHROLOGY | Facility: CLINIC | Age: 70
End: 2025-07-30
Payer: MEDICARE

## 2025-07-30 VITALS
WEIGHT: 247 LBS | OXYGEN SATURATION: 99 % | SYSTOLIC BLOOD PRESSURE: 130 MMHG | BODY MASS INDEX: 33.46 KG/M2 | HEIGHT: 72 IN | RESPIRATION RATE: 18 BRPM | DIASTOLIC BLOOD PRESSURE: 78 MMHG | HEART RATE: 71 BPM

## 2025-07-30 DIAGNOSIS — E11.10 METABOLIC ACIDOSIS DUE TO DIABETES MELLITUS: ICD-10-CM

## 2025-07-30 DIAGNOSIS — N18.4 CKD (CHRONIC KIDNEY DISEASE), STAGE IV: Primary | ICD-10-CM

## 2025-07-30 DIAGNOSIS — I12.9 HYPERTENSIVE NEPHROSCLEROSIS, STAGE 1 THROUGH STAGE 4 OR UNSPECIFIED CHRONIC KIDNEY DISEASE: ICD-10-CM

## 2025-07-30 DIAGNOSIS — E11.21 DIABETIC NEPHROPATHY ASSOCIATED WITH TYPE 2 DIABETES MELLITUS: ICD-10-CM

## 2025-07-30 PROCEDURE — 1101F PT FALLS ASSESS-DOCD LE1/YR: CPT | Mod: CPTII,,, | Performed by: NURSE PRACTITIONER

## 2025-07-30 PROCEDURE — 3288F FALL RISK ASSESSMENT DOCD: CPT | Mod: CPTII,,, | Performed by: NURSE PRACTITIONER

## 2025-07-30 PROCEDURE — 3051F HG A1C>EQUAL 7.0%<8.0%: CPT | Mod: CPTII,,, | Performed by: NURSE PRACTITIONER

## 2025-07-30 PROCEDURE — 3078F DIAST BP <80 MM HG: CPT | Mod: CPTII,,, | Performed by: NURSE PRACTITIONER

## 2025-07-30 PROCEDURE — 3075F SYST BP GE 130 - 139MM HG: CPT | Mod: CPTII,,, | Performed by: NURSE PRACTITIONER

## 2025-07-30 PROCEDURE — 4010F ACE/ARB THERAPY RXD/TAKEN: CPT | Mod: CPTII,,, | Performed by: NURSE PRACTITIONER

## 2025-07-30 PROCEDURE — 1126F AMNT PAIN NOTED NONE PRSNT: CPT | Mod: CPTII,,, | Performed by: NURSE PRACTITIONER

## 2025-07-30 PROCEDURE — 99999 PR PBB SHADOW E&M-EST. PATIENT-LVL V: CPT | Mod: PBBFAC,,, | Performed by: NURSE PRACTITIONER

## 2025-07-30 PROCEDURE — 3062F POS MACROALBUMINURIA REV: CPT | Mod: CPTII,,, | Performed by: NURSE PRACTITIONER

## 2025-07-30 PROCEDURE — 3066F NEPHROPATHY DOC TX: CPT | Mod: CPTII,,, | Performed by: NURSE PRACTITIONER

## 2025-07-30 PROCEDURE — 99215 OFFICE O/P EST HI 40 MIN: CPT | Mod: PBBFAC | Performed by: NURSE PRACTITIONER

## 2025-07-30 PROCEDURE — 1159F MED LIST DOCD IN RCRD: CPT | Mod: CPTII,,, | Performed by: NURSE PRACTITIONER

## 2025-07-30 PROCEDURE — 99213 OFFICE O/P EST LOW 20 MIN: CPT | Mod: S$PBB,,, | Performed by: NURSE PRACTITIONER

## 2025-07-30 PROCEDURE — 3008F BODY MASS INDEX DOCD: CPT | Mod: CPTII,,, | Performed by: NURSE PRACTITIONER

## 2025-07-30 RX ORDER — DAPAGLIFLOZIN 10 MG/1
10 TABLET, FILM COATED ORAL DAILY
Qty: 90 TABLET | Refills: 3 | Status: CANCELLED | OUTPATIENT
Start: 2025-07-30

## 2025-07-30 NOTE — PROGRESS NOTES
[facility] History and Physical  Patient Name: Fredy Clement  MRN: [id number]  Age: 70 y.o.  : 1955    Date: 2025     Chief Complaint: Follow up    HPI :   Mr Clement presents to Nephrology clinic for follow up. He is followed by CAROLE Tellez as his primary care provider.     HTN: diagnosed in . Current regimen includes amlodipine 5 mg daily, carvedilol 25 mg b.i.d., Lasix 20 mg daily, losartan 25 mg daily.      DM2: diagnosed in . Current regimen includes glipizide, farxiga, insulin.     Hx of PE: on coumadin    Hx of stress urinary incontinence following a robotic assisted lap prostatectomy. Patient had artificial urinary sphincter placed by Dr. Fermin. No further issues with urination.     Nephrology history: No FH of kidney disease, no nephrolithiasis, or recurrent UTIs. No OTC medications including NSAIDs. Prior records from 10/2023 with eGFR 29, serum creatinine >2. He was previously see with Dr. Krishnamurthy. He wishes to transfer his to [facility].     Patient denies any CP, SOB, peripheral edema, dysuria, hematuria, changes in urinary habits, or increased frequency of urination. Yeast infection on farxiga twice.     Past Medical History:  has a past medical history of Cancer, CHF (congestive heart failure), Diabetes mellitus, History of DVT (deep vein thrombosis), Hyperlipidemia, Hypertension, and Kidney disease.     Past Surgical History:   has a past surgical history that includes Prostatectomy.     Family History:  family history includes Diabetes in his brother. No family history of kidney disease.     Social History:   reports that he has been smoking. He has never used smokeless tobacco. He reports that he does not currently use alcohol. He reports that he does not use drugs.     Allergies: is allergic to lisinopril-hydrochlorothiazide.     Medications: Reviewed including OTC medications, herbal supplements, and NSAIDS.     Old records  have been reviewed.      Review of Systems:  ROS: A 10 point ROS was completed and found to be negative except for that mentioned above.          Physical Exam:  Vitals:    07/30/25 1423   BP: 130/78   Pulse: 71   Resp: 18         Constitutional: sitting in chair, in NAD  Eyes: EOMI, white sclera  ENMT: moist mucus membranes, nares patent  Cardiovascular: normal rate, S1/S2 noted, no edema  Respiratory: symmetrical chest expansion, CTA-B  Gastrointestinal: +BS, soft, NT/ND  Musculoskeletal: normal, no joint erythema/effusions  Skin: no rash, no purpura, warm extremities  Neurological: Alert and Oriented x 3, afocal    Labs:   Lab Results   Component Value Date     01/30/2025    K 4.3 01/30/2025    CREATININE 3.66 (H) 01/30/2025    HGBA1C 7.4 (A) 02/11/2025    LDLCALC 85 02/20/2025    CHOL 133 02/20/2025    HDL 29 (L) 02/20/2025    TRIG 95 02/20/2025    WBC 6.68 01/30/2025    HGB 11.8 (L) 01/30/2025    HCT 38.5 (L) 01/30/2025     01/30/2025        Otherwise Reviewed    Assessment/Plan:       CKD stage IV in setting of diabetic nephropathy, hypertensive nephrosclerosis. Baseline sCr TBD, today sCr pending. Counseled to avoid nephrotoxic agents such as NSAIDs.     Proteinuria: urine Prot:Creat ratio is pending. Patient is on RAAS blockade with ARB.     Anemia: CBC pending    BMD: Calcium and Phosphorus PTH, Vit D pending    HTN: well controlled with current meds     DM type 2: on ARB, SGLT2i. Agree with both. Had some yeast infections. Has been out for about 3 weeks per patient report. Was approved through [employer]...     Patient was instructed after last labs to return in 2 weeks for RFP that was not collected. No recent labs for review of renal function. Will repeat all labs today.     RTC 3-4 months with CBC, RFP, UA, urine for Prot:creat ratio, PTH, Vit D with Dr. Miller.       Signature:             CAROLE Woodard  [facility]  [id number]  [place] [id number]  [phone]        20 minutes of total time spent on the encounter, which includes face to face time and non-face to face time preparing to see the patient (eg, review of tests), Obtaining and/or reviewing separately obtained history, Documenting clinical information in the electronic or other health record, Independently interpreting results (not separately reported) and communicating results to the patient/family/caregiver, or Care coordination (not separately reported).       Date of encounter: 7/30/25

## 2025-08-05 DIAGNOSIS — R79.89 ELEVATED PTHRP LEVEL: Primary | ICD-10-CM

## 2025-08-05 RX ORDER — CALCITRIOL 0.25 UG/1
0.25 CAPSULE ORAL DAILY
Qty: 90 CAPSULE | Refills: 3 | Status: SHIPPED | OUTPATIENT
Start: 2025-08-05

## 2025-08-05 NOTE — TELEPHONE ENCOUNTER
----- Message from CAROLE Merritt sent at 8/5/2025 11:29 AM CDT -----  Please let patient know that kidney function remains stable. Still has proteinuria, is he able to get Farxiga now? His PTH is high, needs calcitriol. Thank you!  ----- Message -----  From: Lab, Background User  Sent: 7/30/2025   3:54 PM CDT  To: CAROLE Lindsey    
Spoke w/ , he is aware of his lab work and the medication that is being sent in. He was unable to get the farxiga.   
2